# Patient Record
Sex: FEMALE | Race: WHITE | NOT HISPANIC OR LATINO | Employment: FULL TIME | URBAN - METROPOLITAN AREA
[De-identification: names, ages, dates, MRNs, and addresses within clinical notes are randomized per-mention and may not be internally consistent; named-entity substitution may affect disease eponyms.]

---

## 2022-05-31 ENCOUNTER — TELEPHONE (OUTPATIENT)
Dept: PODIATRY | Facility: CLINIC | Age: 37
End: 2022-05-31

## 2022-09-06 DIAGNOSIS — T75.3XXD MOTION SICKNESS, SUBSEQUENT ENCOUNTER: Primary | ICD-10-CM

## 2022-09-06 RX ORDER — SCOLOPAMINE TRANSDERMAL SYSTEM 1 MG/1
1 PATCH, EXTENDED RELEASE TRANSDERMAL
Qty: 4 PATCH | Refills: 0 | Status: SHIPPED | OUTPATIENT
Start: 2022-09-06 | End: 2023-02-22 | Stop reason: SDUPTHER

## 2023-05-06 ENCOUNTER — APPOINTMENT (OUTPATIENT)
Dept: LAB | Facility: CLINIC | Age: 38
End: 2023-05-06

## 2023-05-06 DIAGNOSIS — Z00.8 HEALTH EXAMINATION IN POPULATION SURVEY: ICD-10-CM

## 2023-05-06 LAB
CHOLEST SERPL-MCNC: 159 MG/DL
HDLC SERPL-MCNC: 76 MG/DL
LDLC SERPL CALC-MCNC: 74 MG/DL (ref 0–100)
NONHDLC SERPL-MCNC: 83 MG/DL
TRIGL SERPL-MCNC: 43 MG/DL

## 2023-05-07 LAB
EST. AVERAGE GLUCOSE BLD GHB EST-MCNC: 88 MG/DL
HBA1C MFR BLD: 4.7 %

## 2023-05-10 ENCOUNTER — APPOINTMENT (OUTPATIENT)
Dept: LAB | Facility: CLINIC | Age: 38
End: 2023-05-10

## 2023-05-10 ENCOUNTER — OFFICE VISIT (OUTPATIENT)
Dept: FAMILY MEDICINE CLINIC | Facility: CLINIC | Age: 38
End: 2023-05-10

## 2023-05-10 VITALS
BODY MASS INDEX: 27.05 KG/M2 | DIASTOLIC BLOOD PRESSURE: 82 MMHG | SYSTOLIC BLOOD PRESSURE: 120 MMHG | OXYGEN SATURATION: 98 % | RESPIRATION RATE: 16 BRPM | HEART RATE: 70 BPM | HEIGHT: 62 IN | WEIGHT: 147 LBS

## 2023-05-10 DIAGNOSIS — R53.83 FATIGUE, UNSPECIFIED TYPE: ICD-10-CM

## 2023-05-10 DIAGNOSIS — E01.0 THYROMEGALY: Primary | ICD-10-CM

## 2023-05-10 DIAGNOSIS — E55.9 VITAMIN D DEFICIENCY: ICD-10-CM

## 2023-05-10 DIAGNOSIS — R63.5 WEIGHT GAIN: ICD-10-CM

## 2023-05-10 DIAGNOSIS — E01.0 THYROMEGALY: ICD-10-CM

## 2023-05-10 LAB
25(OH)D3 SERPL-MCNC: 16.5 NG/ML (ref 30–100)
ALBUMIN SERPL BCP-MCNC: 4.2 G/DL (ref 3.5–5)
ALP SERPL-CCNC: 38 U/L (ref 34–104)
ALT SERPL W P-5'-P-CCNC: 16 U/L (ref 7–52)
ANION GAP SERPL CALCULATED.3IONS-SCNC: 5 MMOL/L (ref 4–13)
AST SERPL W P-5'-P-CCNC: 19 U/L (ref 13–39)
BASOPHILS # BLD AUTO: 0.04 THOUSANDS/ÂΜL (ref 0–0.1)
BASOPHILS NFR BLD AUTO: 1 % (ref 0–1)
BILIRUB SERPL-MCNC: 0.48 MG/DL (ref 0.2–1)
BUN SERPL-MCNC: 19 MG/DL (ref 5–25)
CALCIUM SERPL-MCNC: 9 MG/DL (ref 8.4–10.2)
CHLORIDE SERPL-SCNC: 105 MMOL/L (ref 96–108)
CO2 SERPL-SCNC: 26 MMOL/L (ref 21–32)
CREAT SERPL-MCNC: 0.79 MG/DL (ref 0.6–1.3)
EOSINOPHIL # BLD AUTO: 0.08 THOUSAND/ÂΜL (ref 0–0.61)
EOSINOPHIL NFR BLD AUTO: 2 % (ref 0–6)
ERYTHROCYTE [DISTWIDTH] IN BLOOD BY AUTOMATED COUNT: 12.6 % (ref 11.6–15.1)
GFR SERPL CREATININE-BSD FRML MDRD: 97 ML/MIN/1.73SQ M
GLUCOSE P FAST SERPL-MCNC: 86 MG/DL (ref 65–99)
HCT VFR BLD AUTO: 40.7 % (ref 34.8–46.1)
HGB BLD-MCNC: 13.1 G/DL (ref 11.5–15.4)
IMM GRANULOCYTES # BLD AUTO: 0.02 THOUSAND/UL (ref 0–0.2)
IMM GRANULOCYTES NFR BLD AUTO: 0 % (ref 0–2)
LYMPHOCYTES # BLD AUTO: 1.78 THOUSANDS/ÂΜL (ref 0.6–4.47)
LYMPHOCYTES NFR BLD AUTO: 32 % (ref 14–44)
MCH RBC QN AUTO: 30.7 PG (ref 26.8–34.3)
MCHC RBC AUTO-ENTMCNC: 32.2 G/DL (ref 31.4–37.4)
MCV RBC AUTO: 95 FL (ref 82–98)
MONOCYTES # BLD AUTO: 0.49 THOUSAND/ÂΜL (ref 0.17–1.22)
MONOCYTES NFR BLD AUTO: 9 % (ref 4–12)
NEUTROPHILS # BLD AUTO: 3.1 THOUSANDS/ÂΜL (ref 1.85–7.62)
NEUTS SEG NFR BLD AUTO: 56 % (ref 43–75)
NRBC BLD AUTO-RTO: 0 /100 WBCS
PLATELET # BLD AUTO: 239 THOUSANDS/UL (ref 149–390)
PMV BLD AUTO: 10 FL (ref 8.9–12.7)
POTASSIUM SERPL-SCNC: 4.4 MMOL/L (ref 3.5–5.3)
PROT SERPL-MCNC: 6.6 G/DL (ref 6.4–8.4)
RBC # BLD AUTO: 4.27 MILLION/UL (ref 3.81–5.12)
SODIUM SERPL-SCNC: 136 MMOL/L (ref 135–147)
TSH SERPL DL<=0.05 MIU/L-ACNC: 1.03 UIU/ML (ref 0.45–4.5)
WBC # BLD AUTO: 5.51 THOUSAND/UL (ref 4.31–10.16)

## 2023-05-10 NOTE — PROGRESS NOTES
Assessment/Plan:    1  Thyromegaly - left gland, US ordered    2  Fatigue - will start with labs    3  Weight gain - will start with labs    F/u pending results  F/u as needed        Subjective:   Chief Complaint   Patient presents with   • Thyroid Problem     Wants thyroid and hormone levels checked      Patient ID: Mckinley Cowden is a 30 y o  female  Patient feeling off, feeling bloated, gaining 10-12 lbs  Hair thinning thinning nails  Felt like a sudden shift  Working out 10 x a week, eating healthy  Sleeping well  Weened self off Wellbutrin and feels well mentally  Periods regular  The following portions of the patient's history were reviewed and updated as appropriate: allergies, current medications, past family history, past medical history, past social history, past surgical history and problem list     Past Medical History:   Diagnosis Date   • Bunion     Have had since childhood   • COVID 2022   • Depression    • History of seasonal allergies    • Hypotension    • Normal delivery      daughter   • Varicella     childhood   • Wears glasses      Past Surgical History:   Procedure Laterality Date   • AUGMENTATION BREAST Bilateral    • INDUCED      • FL BLEPHAROPLASTY LOWER EYELID Bilateral 10/4/2022    Procedure: BLEPHAROPLASTY LOWER;  Surgeon: Bryon Reveles MD;  Location: [address] [place] [address];  Service: Plastics   • FL  DELIVERY ONLY N/A 2018    Procedure:  SECTION ();   Surgeon: Micaela Jalloh MD;  Location: [facility];  Service: Obstetrics   • Piroska U  97  W/SESMDC W/DIST Kira Stall Bilateral 2022    Procedure: Ludin Turner;  Surgeon: Deanna Jaquez DPM;  Location: [address] [place] [address];  Service: Podiatry   • FL LIG/TRNSXJ FALOPIAN TUBE  DEL/ABDML SURG Bilateral 2018    Procedure: LIGATION/COAGULATION TUBAL;  Surgeon: Micaela Jalloh MD;  Location: [facility];  Service: Obstetrics   • WISDOM TOOTH EXTRACTION       Family History Problem Relation Age of Onset   • No Known Problems Mother    • No Known Problems Father    • No Known Problems Brother    • No Known Problems Daughter    • No Known Problems Maternal Grandmother    • No Known Problems Maternal Grandfather    • No Known Problems Paternal Grandmother    • Cancer Paternal Grandfather         lung   • Cancer Maternal Aunt         melanoma   • Alcohol abuse Neg Hx    • Substance Abuse Neg Hx    • Mental illness Neg Hx      Social History     Socioeconomic History   • Marital status: Legally      Spouse name: Tanesha Mcduffie   • Number of children: 1   • Years of education: Not on file   • Highest education level: Not on file   Occupational History   • 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  Employer: [employer]   Tobacco Use   • Smoking status: Former     Packs/day: 1 00     Years: 5 00     Pack years: 5 00     Types: Cigarettes     Quit date:      Years since quittin 3   • Smokeless tobacco: Never   Vaping Use   • Vaping Use: Never used   Substance and Sexual Activity   • Alcohol use: Yes     Alcohol/week: 2 0 standard drinks     Types: 2 Glasses of wine per week     Comment: Socially; 1-2X WEEK   • Drug use: Never   • Sexual activity: Yes     Partners: Male     Birth control/protection: Female Sterilization   Other Topics Concern   • Not on file   Social History Narrative   • Not on file     Social Determinants of Health     Financial Resource Strain: Not on file   Food Insecurity: Not on file   Transportation Needs: Not on file   Physical Activity: Not on file   Stress: Not on file   Social Connections: Not on file   Intimate Partner Violence: Not on file   Housing Stability: Not on file       Current Outpatient Medications:   •  hydroquinone 4 % cream, Apply twice daily all over face  Apply larger amount over brown spots   Continue for 3 months, then stop , Disp: 28 35 g, Rfl: 2  •  scopolamine (TRANSDERM-SCOP) 1 mg/3 days TD 72 hr patch, Place 1 patch on the skin over 72 hours "every third day, Disp: 4 patch, Rfl: 0    Review of Systems          Objective:    Vitals:    05/10/23 1034   BP: 120/82   Pulse: 70   Resp: 16   SpO2: 98%   Weight: 66 7 kg (147 lb)   Height: 5' 2\" (1 575 m)        Physical Exam  Constitutional:       Appearance: Normal appearance  She is well-developed and normal weight  HENT:      Head: Normocephalic and atraumatic  Neck:      Thyroid: Thyromegaly present  No thyroid mass or thyroid tenderness  Cardiovascular:      Rate and Rhythm: Normal rate and regular rhythm  Pulses: Normal pulses  Heart sounds: Normal heart sounds  No murmur heard  Pulmonary:      Effort: Pulmonary effort is normal  No respiratory distress  Breath sounds: Normal breath sounds  No wheezing or rales  Abdominal:      General: Abdomen is flat  Bowel sounds are normal  There is no distension  Palpations: Abdomen is soft  There is no mass  Tenderness: There is no abdominal tenderness  Musculoskeletal:         General: Normal range of motion  Cervical back: Normal range of motion and neck supple  Lymphadenopathy:      Cervical: No cervical adenopathy  Skin:     General: Skin is warm and dry  Neurological:      General: No focal deficit present  Mental Status: She is alert and oriented to person, place, and time  Cranial Nerves: No cranial nerve deficit  Deep Tendon Reflexes: Reflexes are normal and symmetric  Psychiatric:         Mood and Affect: Mood normal          Behavior: Behavior normal          Thought Content:  Thought content normal          Judgment: Judgment normal                "

## 2023-05-15 DIAGNOSIS — R63.5 WEIGHT GAIN: ICD-10-CM

## 2023-05-15 DIAGNOSIS — R53.83 FATIGUE, UNSPECIFIED TYPE: Primary | ICD-10-CM

## 2023-05-15 DIAGNOSIS — E01.0 THYROMEGALY: ICD-10-CM

## 2023-05-17 ENCOUNTER — APPOINTMENT (OUTPATIENT)
Dept: LAB | Facility: CLINIC | Age: 38
End: 2023-05-17

## 2023-05-17 DIAGNOSIS — E01.0 THYROMEGALY: ICD-10-CM

## 2023-05-17 DIAGNOSIS — R53.83 FATIGUE, UNSPECIFIED TYPE: ICD-10-CM

## 2023-05-17 DIAGNOSIS — R63.5 WEIGHT GAIN: ICD-10-CM

## 2023-05-17 LAB
CORTIS AM PEAK SERPL-MCNC: 14.1 UG/DL (ref 6.7–22.6)
INSULIN SERPL-ACNC: 4.11 UIU/ML (ref 1.9–23)
T3 SERPL-MCNC: 0.9 NG/ML
T4 FREE SERPL-MCNC: 0.97 NG/DL (ref 0.61–1.12)

## 2023-05-18 ENCOUNTER — HOSPITAL ENCOUNTER (OUTPATIENT)
Dept: ULTRASOUND IMAGING | Facility: HOSPITAL | Age: 38
Discharge: HOME | End: 2023-05-18

## 2023-05-18 DIAGNOSIS — E01.0 THYROMEGALY: ICD-10-CM

## 2023-05-23 ENCOUNTER — TELEPHONE (OUTPATIENT)
Dept: FAMILY MEDICINE CLINIC | Facility: CLINIC | Age: 38
End: 2023-05-23

## 2023-05-23 NOTE — TELEPHONE ENCOUNTER
Call from Fouzia at [facility], she said the result is in and there a re significant findings    See chart

## 2023-05-24 ENCOUNTER — TELEPHONE (OUTPATIENT)
Dept: PODIATRY | Facility: CLINIC | Age: 38
End: 2023-05-24

## 2023-05-30 ENCOUNTER — OFFICE VISIT (OUTPATIENT)
Dept: OBGYN CLINIC | Facility: CLINIC | Age: 38
End: 2023-05-30

## 2023-05-30 VITALS — DIASTOLIC BLOOD PRESSURE: 70 MMHG | WEIGHT: 145.8 LBS | SYSTOLIC BLOOD PRESSURE: 118 MMHG | BODY MASS INDEX: 26.67 KG/M2

## 2023-05-30 DIAGNOSIS — R14.0 ABDOMINAL BLOATING: Primary | ICD-10-CM

## 2023-05-30 DIAGNOSIS — R63.5 WEIGHT GAIN: ICD-10-CM

## 2023-05-30 NOTE — PROGRESS NOTES
Assessment/Plan:  Recent weight gain -reassured   nl TSH, Cortisol and insulin levels  No obvious explanation but has recently undergone foot surgery associated with a pause in exercise, recent discontinuation of Wellbutrin, increased stress and anxiety over NP education  During this time she has experienced abdominal bloating -recommend ultrasound of the pelvis  Vitals - 1 value per visit 2020   Weight (lb) 139 6 136 1 135 8 136 8     Vitals - 1 value per visit 10/4/2022 11/3/2022 11/15/2022 2022   Weight (lb) 136 142 140 3 140     Vitals - 1 value per visit 2022 2022 12/15/2022 2022   Weight (lb) 140 140  141     Vitals - 1 value per visit 2023 5/10/2023 2023   Weight (lb) 141 147 145 8       Diagnoses and all orders for this visit:    Abdominal bloating  -     US pelvis complete non OB; Future    Weight gain  -     US pelvis complete non OB; Future              Subjective:        Patient ID: John Trevino is a 30 y o  female  Tammy Rich is a  white female presenting to establish care  She was referred by her PCP due to complaints of weight gain and bloating which she noticed in 2022  She usually weighs 133-135lbs at home but weighed 140lbs back in January  Of note, she had bunion surgery in December and stopped exercising at that time  She also weaned herself off Wellbutrin back in February  She reestablished a routine recently  Her PCP tested for thyroid disease as well as cortisol levels and fasting insulin which were all normal and suggested she come to GYN for further evaluation  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 and admits to increased amount of stress in her life  There is no family history of obesity  Her last menstrual period was May 22  They seem to be lighter than usual   They are occurring every 26 to 28 days  She is in a monogamous relationship for the past 3 years    A tubal ligation was done at the time of her  section in 2018  The following portions of the patient's history were reviewed and updated as appropriate: She  has a past medical history of Bunion, COVID (2022), Depression, History of seasonal allergies, Hypotension, Normal delivery, Varicella, and Wears glasses  Patient Active Problem List    Diagnosis Date Noted   • Dermatochalasis 2022   • Situational depression 2020   • ADHD, predominantly inattentive type 2012    PMH:  Mark Nguyen  G3, P2; VIP ~18, SAVD F 7-7 , C/S with Tubal Ligation for Breech  M -  Neg Hx of STD's  ADHD  Situational Depression , weaned off of Wellbutrin   Covid   R Bunionectomy   She  has a past surgical history that includes [place] tooth extraction; Induced ; pr  delivery only (N/A, 2018); pr lig/trnsxj falopian tube  del/abdml surg (Bilateral, 2018); AUGMENTATION BREAST (Bilateral); pr blepharoplasty lower eyelid (Bilateral, 10/4/2022); and pr corrj hallux valgus w/sesmdc w/dist metar osteot (Bilateral, 2022)  Her family history includes Cancer in her maternal aunt and paternal grandfather; No Known Problems in her brother, daughter, father, maternal grandfather, maternal grandmother, mother, and paternal grandmother  FH:  MA - Melanoma d 46  PGF - Smoker, d Lung Ca 70  No FH of Obesity  She  reports that she quit smoking about 13 years ago  Her smoking use included cigarettes  She has a 5 00 pack-year smoking history  She has never used smokeless tobacco  She reports current alcohol use of about 2 0 standard drinks of alcohol per week  She reports that she does not use drugs  SH:  Charge RN [facility]  Studying for Psychiatric NP  Partner, Lilly Dos Santos, since   Current Outpatient Medications   Medication Sig Dispense Refill   • hydroquinone 4 % cream Apply twice daily all over face  Apply larger amount over brown spots   Continue for 3 months, then stop  28 35 g 2   • scopolamine (TRANSDERM-SCOP) 1 mg/3 days TD 72 hr patch Place 1 patch on the skin over 72 hours every third day 4 patch 0     No current facility-administered medications for this visit  Current Outpatient Medications on File Prior to Visit   Medication Sig   • hydroquinone 4 % cream Apply twice daily all over face  Apply larger amount over brown spots  Continue for 3 months, then stop  • scopolamine (TRANSDERM-SCOP) 1 mg/3 days TD 72 hr patch Place 1 patch on the skin over 72 hours every third day     No current facility-administered medications on file prior to visit  She is allergic to penicillins       Review of Systems   Constitutional: Positive for activity change and unexpected weight change  Negative for appetite change and fatigue  Respiratory: Negative for cough, chest tightness and shortness of breath  Cardiovascular: Negative for chest pain, palpitations and leg swelling  Gastrointestinal: Positive for abdominal distention  Negative for abdominal pain, constipation, diarrhea and vomiting  Genitourinary: Negative for difficulty urinating, dyspareunia, dysuria, flank pain, frequency, menstrual problem, pelvic pain, urgency, vaginal discharge and vaginal pain  Sexually active 3 times a week without problems   Musculoskeletal: Negative for arthralgias, back pain, gait problem and neck stiffness  Skin: Negative for color change  Neurological: Negative for dizziness, weakness, light-headedness and headaches  Psychiatric/Behavioral: Negative for dysphoric mood  The patient is nervous/anxious (Regarding school mentions feeling chronicly stressed)  Objective:    Vitals:    05/30/23 1507   BP: 118/70   BP Location: Right arm   Patient Position: Sitting   Cuff Size: Standard   Weight: 66 1 kg (145 lb 12 8 oz)            Physical Exam  Vitals and nursing note reviewed  Exam conducted with a chaperone present     Constitutional:       General: She is not in acute distress  Appearance: Normal appearance  She is normal weight  She is not ill-appearing  HENT:      Head: Normocephalic and atraumatic  Eyes:      General: No scleral icterus  Right eye: No discharge  Left eye: No discharge  Extraocular Movements: Extraocular movements intact  Cardiovascular:      Rate and Rhythm: Normal rate and regular rhythm  Heart sounds: Normal heart sounds  No murmur heard  Pulmonary:      Effort: Pulmonary effort is normal  No respiratory distress  Breath sounds: Normal breath sounds  Abdominal:      General: Abdomen is flat  There is no distension  Palpations: Abdomen is soft  Tenderness: There is no abdominal tenderness  There is no right CVA tenderness, left CVA tenderness, guarding or rebound  Genitourinary:     General: Normal vulva  Comments: The vagina and cervix are unremarkable  The cervix is nulliparous in appearance with clear mucus  Uterus is normal size and mobile  There are no adnexal masses appreciated  There is no pelvic tenderness  Musculoskeletal:         General: No swelling  Right lower leg: No edema  Left lower leg: No edema  Skin:     General: Skin is warm and dry  Neurological:      Mental Status: She is alert and oriented to person, place, and time  Psychiatric:         Mood and Affect: Mood normal          Behavior: Behavior normal          Thought Content:  Thought content normal          Judgment: Judgment normal

## 2023-05-30 NOTE — LETTER
May 30, 2023     Tyler [address], [address] [id number]    Patient: Roseanna Gilbert   YOB: 1985   Date of Visit: 2023       Dear Dr Oralia Feliz: Thank you for referring Lionel Dallas to me for evaluation  Below are my notes for this consultation  I was unable to come up with a satisfactory explanation for her weight gain although she has several contributing factors-recent surgery with the discontinuation of exercise, increased workload and stress with school, and the recent discontinuation of Wellbutrin which can help people lose weight  And lastly advancing age  Because of the bloating I have recommended an ultrasound of the pelvis although I think the yield for this will be low regarding any ovarian pathology  If you have questions, please do not hesitate to call me  I look forward to following your patient along with you  Sincerely,        Suraj Merchant MD        CC: No Recipients    Suraj Merchant MD  2023  4:13 PM  Incomplete  Assessment/Plan:  Recent weight gain -reassured  nl TSH, Cortisol and insulin levels  No obvious explanation but has recently undergone foot surgery associated with a pause in exercise, recent discontinuation of Wellbutrin, increased stress and anxiety over NP education  During this time she has experienced abdominal bloating -recommend ultrasound of the pelvis    Diagnoses and all orders for this visit:    Abdominal bloating  -     US pelvis complete non OB; Future    Weight gain  -     US pelvis complete non OB; Future             Subjective:       Patient ID: Roseanna Gilbert is a 30 y o  female  Gerri Gustafson is a  white female presenting to [facility] care  She was referred by her PCP due to complaints of weight gain and bloating which she noticed in 2022  She usually weighs 133-135lbs at home but weighed 140lbs back in January   Of note, she had bunion surgery in December and stopped exercising at that time  She also weaned herself off Wellbutrin back in February  She reestablished a routine recently  Her PCP tested for thyroid disease as well as cortisol levels and fasting insulin which were all normal and suggested she come to GYN for further evaluation  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 and admits to increased amount of stress in her life  There is no family history of obesity  Her last menstrual period was May 22  They seem to be lighter than usual   They are occurring every 26 to 28 days  She is in a monogamous relationship for the past 3 years  A tubal ligation was done at the time of her  section in 2018  The following portions of the patient's history were reviewed and updated as appropriate: She  has a past medical history of Bunion, COVID (2022), Depression, History of seasonal allergies, Hypotension, Normal delivery, Varicella, and Wears glasses  Patient Active Problem List    Diagnosis Date Noted   • Dermatochalasis 2022   • Situational depression 2020   • ADHD, predominantly inattentive type 2012    PMH:  Amado Arsh  G3, P2; VIP ~18, SAVD F 7-7 , C/S with Tubal Ligation for Breech  M -  Neg Hx of STD's  ADHD  Situational Depression , weaned off of Wellbutrin   Covid   R Bunionectomy   She  has a past surgical history that includes [place] tooth extraction; Induced ; pr  delivery only (N/A, 2018); pr lig/trnsxj falopian tube  del/abdml surg (Bilateral, 2018); AUGMENTATION BREAST (Bilateral); pr blepharoplasty lower eyelid (Bilateral, 10/4/2022); and pr corrj hallux valgus w/sesmdc w/dist metar osteot (Bilateral, 2022)  Her family history includes Cancer in her maternal aunt and paternal grandfather; No Known Problems in her brother, daughter, father, maternal grandfather, maternal grandmother, mother, and paternal grandmother     FH:  MA - Melanoma d 52  PGF - Smoker, d Lung Ca 72  No FH of Obesity  She  reports that she quit smoking about 13 years ago  Her smoking use included cigarettes  She has a 5 00 pack-year smoking history  She has never used smokeless tobacco  She reports current alcohol use of about 2 0 standard drinks of alcohol per week  She reports that she does not use drugs  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  Partner, Jordin Arroyo, since '20  Current Outpatient Medications   Medication Sig Dispense Refill   • hydroquinone 4 % cream Apply twice daily all over face  Apply larger amount over brown spots  Continue for 3 months, then stop  28 35 g 2   • scopolamine (TRANSDERM-SCOP) 1 mg/3 days TD 72 hr patch Place 1 patch on the skin over 72 hours every third day 4 patch 0     No current facility-administered medications for this visit  Current Outpatient Medications on File Prior to Visit   Medication Sig   • hydroquinone 4 % cream Apply twice daily all over face  Apply larger amount over brown spots  Continue for 3 months, then stop  • scopolamine (TRANSDERM-SCOP) 1 mg/3 days TD 72 hr patch Place 1 patch on the skin over 72 hours every third day     No current facility-administered medications on file prior to visit  She is allergic to penicillins       Review of Systems   Constitutional: Positive for activity change and unexpected weight change  Negative for appetite change and fatigue  Respiratory: Negative for cough, chest tightness and shortness of breath  Cardiovascular: Negative for chest pain, palpitations and leg swelling  Gastrointestinal: Positive for abdominal distention  Negative for abdominal pain, constipation, diarrhea and vomiting  Genitourinary: Negative for difficulty urinating, dyspareunia, dysuria, flank pain, frequency, menstrual problem, pelvic pain, urgency, vaginal discharge and vaginal pain          Sexually active 3 times a week without problems   Musculoskeletal: Negative for arthralgias, back pain, gait problem and neck stiffness  Skin: Negative for color change  Neurological: Negative for dizziness, weakness, light-headedness and headaches  Psychiatric/Behavioral: Negative for dysphoric mood  The patient is nervous/anxious (Regarding school mentions feeling chronicly stressed)  Objective:    Vitals:    05/30/23 1507   BP: 118/70   BP Location: Right arm   Patient Position: Sitting   Cuff Size: Standard   Weight: 66 1 kg (145 lb 12 8 oz)           Physical Exam  Vitals and nursing note reviewed  Exam conducted with a chaperone present  Constitutional:       General: She is not in acute distress  Appearance: Normal appearance  She is normal weight  She is not ill-appearing  HENT:      Head: Normocephalic and atraumatic  Eyes:      General: No scleral icterus  Right eye: No discharge  Left eye: No discharge  Extraocular Movements: Extraocular movements intact  Cardiovascular:      Rate and Rhythm: Normal rate and regular rhythm  Heart sounds: Normal heart sounds  No murmur heard  Pulmonary:      Effort: Pulmonary effort is normal  No respiratory distress  Breath sounds: Normal breath sounds  Abdominal:      General: Abdomen is flat  There is no distension  Palpations: Abdomen is soft  Tenderness: There is no abdominal tenderness  There is no right CVA tenderness, left CVA tenderness, guarding or rebound  Genitourinary:     General: Normal vulva  Comments: The vagina and cervix are unremarkable  The cervix is nulliparous in appearance with clear mucus  Uterus is normal size and mobile  There are no adnexal masses appreciated  There is no pelvic tenderness  Musculoskeletal:         General: No swelling  Right lower leg: No edema  Left lower leg: No edema  Skin:     General: Skin is warm and dry  Neurological:      Mental Status: She is alert and oriented to person, place, and time     Psychiatric: Mood and Affect: Mood normal          Behavior: Behavior normal          Thought Content: Thought content normal          Judgment: Judgment normal           Martin Richey MD  2023  3:44 PM  Sign when Signing Visit  Assessment/Plan:    nl TSH, Cortisol and insulin levels    Diagnoses and all orders for this visit:    Abdominal bloating  -     US pelvis complete non OB; Future    Weight gain  -     US pelvis complete non OB; Future             Subjective:       Patient ID: John Trevino is a 30 y o  female  Tammy Rich is a  white female presenting to establish care  She was referred by her PCP due to complaints of weight gain and bloating which she noticed in 2022  She usually weighs 133-135lbs at home but weighed 140lbs back in January  Of note, she had bunion surgery in December and stopped exercising at the time  She also weaned herself off Wellbutrin back in February  Her PCP tested for thyroid disease as well as cortisol levels and fasting insulin which were all normal and suggested she come to GYN for further evaluation  The following portions of the patient's history were reviewed and updated as appropriate: She  has a past medical history of Bunion COVID (2022), Depression, History of seasonal allergies, Hypotension, Normal delivery, Varicella, and Wears glasses  Patient Active Problem List    Diagnosis Date Noted   • Dermatochalasis 2022   • Situational depression 2020   • ADHD, predominantly inattentive type 2012     She  has a past surgical history that includes [place] tooth extraction; Induced ; pr  delivery only (N/A, 2018); pr lig/trnsxj falopian tube  del/abdml surg (Bilateral, 2018); AUGMENTATION BREAST (Bilateral); pr blepharoplasty lower eyelid (Bilateral, 10/4/2022); and pr corrj hallux valgus w/sesmdc w/dist metar osteot (Bilateral, 2022)    Her family history includes Cancer in her maternal aunt and paternal grandfather; No Known Problems in her brother, daughter, father, maternal grandfather, maternal grandmother, mother, and paternal grandmother  She  reports that she quit smoking about 13 years ago  Her smoking use included cigarettes  She has a 5 00 pack-year smoking history  She has never used smokeless tobacco  She reports current alcohol use of about 2 0 standard drinks of alcohol per week  She reports that she does not use drugs  Current Outpatient Medications   Medication Sig Dispense Refill   • hydroquinone 4 % cream Apply twice daily all over face  Apply larger amount over brown spots  Continue for 3 months, then stop  28 35 g 2   • scopolamine (TRANSDERM-SCOP) 1 mg/3 days TD 72 hr patch Place 1 patch on the skin over 72 hours every third day 4 patch 0     No current facility-administered medications for this visit  Current Outpatient Medications on File Prior to Visit   Medication Sig   • hydroquinone 4 % cream Apply twice daily all over face  Apply larger amount over brown spots  Continue for 3 months, then stop  • scopolamine (TRANSDERM-SCOP) 1 mg/3 days TD 72 hr patch Place 1 patch on the skin over 72 hours every third day     No current facility-administered medications on file prior to visit  She is allergic to penicillins       Review of Systems   Respiratory: Negative for shortness of breath  Cardiovascular: Negative for chest pain  Gastrointestinal: Positive for abdominal distention  Negative for abdominal pain, diarrhea and vomiting  Genitourinary: Negative for dyspareunia, dysuria and menstrual problem  Sexually active 3 times a week without problems   Psychiatric/Behavioral: Negative for dysphoric mood  Objective:    Vitals:    05/30/23 1507   BP: 118/70   BP Location: Right arm   Patient Position: Sitting   Cuff Size: Standard   Weight: 66 1 kg (145 lb 12 8 oz)           Physical Exam  Vitals and nursing note reviewed   Exam conducted with a chaperone present  Constitutional:       General: She is not in acute distress  Appearance: Normal appearance  She is normal weight  She is not ill-appearing  HENT:      Head: Normocephalic and atraumatic  Eyes:      General: No scleral icterus  Right eye: No discharge  Left eye: No discharge  Extraocular Movements: Extraocular movements intact  Cardiovascular:      Rate and Rhythm: Normal rate and regular rhythm  Heart sounds: Normal heart sounds  No murmur heard  Pulmonary:      Effort: Pulmonary effort is normal  No respiratory distress  Breath sounds: Normal breath sounds  Abdominal:      General: Abdomen is flat  There is no distension  Palpations: Abdomen is soft  Tenderness: There is no abdominal tenderness  There is no right CVA tenderness, left CVA tenderness, guarding or rebound  Genitourinary:     General: Normal vulva  Comments: The vagina and cervix are unremarkable  The cervix is nulliparous in appearance with clear mucus  Uterus is normal size and mobile  There are no adnexal masses appreciated  There is no pelvic tenderness  Musculoskeletal:         General: No swelling  Right lower leg: No edema  Left lower leg: No edema  Skin:     General: Skin is warm and dry  Neurological:      Mental Status: She is alert and oriented to person, place, and time  Psychiatric:         Mood and Affect: Mood normal          Behavior: Behavior normal          Thought Content:  Thought content normal          Judgment: Judgment normal

## 2023-06-22 ENCOUNTER — APPOINTMENT (EMERGENCY)
Dept: CT IMAGING | Facility: HOSPITAL | Age: 38
End: 2023-06-22
Payer: COMMERCIAL

## 2023-06-22 ENCOUNTER — HOSPITAL ENCOUNTER (EMERGENCY)
Facility: HOSPITAL | Age: 38
Discharge: HOME | End: 2023-06-23
Attending: EMERGENCY MEDICINE
Payer: COMMERCIAL

## 2023-06-22 DIAGNOSIS — K29.00 ACUTE GASTRITIS: Primary | ICD-10-CM

## 2023-06-22 DIAGNOSIS — M79.89 LEFT ARM SWELLING: ICD-10-CM

## 2023-06-22 LAB
ALBUMIN SERPL BCP-MCNC: 4 G/DL (ref 3.5–5)
ALP SERPL-CCNC: 49 U/L (ref 34–104)
ALT SERPL W P-5'-P-CCNC: 13 U/L (ref 7–52)
ANION GAP SERPL CALCULATED.3IONS-SCNC: 7 MMOL/L
AST SERPL W P-5'-P-CCNC: 17 U/L (ref 13–39)
BASOPHILS # BLD AUTO: 0.04 THOUSANDS/ÂΜL (ref 0–0.1)
BASOPHILS NFR BLD AUTO: 1 % (ref 0–1)
BILIRUB SERPL-MCNC: 0.23 MG/DL (ref 0.2–1)
BILIRUB UR QL STRIP: NEGATIVE
BUN SERPL-MCNC: 14 MG/DL (ref 5–25)
CALCIUM SERPL-MCNC: 9.1 MG/DL (ref 8.4–10.2)
CHLORIDE SERPL-SCNC: 105 MMOL/L (ref 96–108)
CLARITY UR: CLEAR
CO2 SERPL-SCNC: 26 MMOL/L (ref 21–32)
COLOR UR: COLORLESS
CREAT SERPL-MCNC: 0.71 MG/DL (ref 0.6–1.3)
EOSINOPHIL # BLD AUTO: 0.16 THOUSAND/ÂΜL (ref 0–0.61)
EOSINOPHIL NFR BLD AUTO: 2 % (ref 0–6)
ERYTHROCYTE [DISTWIDTH] IN BLOOD BY AUTOMATED COUNT: 12.5 % (ref 11.6–15.1)
EXT PREGNANCY TEST URINE: NEGATIVE
EXT. CONTROL: NORMAL
GFR SERPL CREATININE-BSD FRML MDRD: 110 ML/MIN/1.73SQ M
GLUCOSE SERPL-MCNC: 84 MG/DL (ref 65–140)
GLUCOSE UR STRIP-MCNC: NEGATIVE MG/DL
HCT VFR BLD AUTO: 40.8 % (ref 34.8–46.1)
HGB BLD-MCNC: 13.5 G/DL (ref 11.5–15.4)
HGB UR QL STRIP.AUTO: NEGATIVE
IMM GRANULOCYTES # BLD AUTO: 0.04 THOUSAND/UL (ref 0–0.2)
IMM GRANULOCYTES NFR BLD AUTO: 1 % (ref 0–2)
KETONES UR STRIP-MCNC: NEGATIVE MG/DL
LEUKOCYTE ESTERASE UR QL STRIP: NEGATIVE
LIPASE SERPL-CCNC: 24 U/L (ref 11–82)
LYMPHOCYTES # BLD AUTO: 2.94 THOUSANDS/ÂΜL (ref 0.6–4.47)
LYMPHOCYTES NFR BLD AUTO: 39 % (ref 14–44)
MCH RBC QN AUTO: 31 PG (ref 26.8–34.3)
MCHC RBC AUTO-ENTMCNC: 33.1 G/DL (ref 31.4–37.4)
MCV RBC AUTO: 94 FL (ref 82–98)
MONOCYTES # BLD AUTO: 0.75 THOUSAND/ÂΜL (ref 0.17–1.22)
MONOCYTES NFR BLD AUTO: 10 % (ref 4–12)
NEUTROPHILS # BLD AUTO: 3.66 THOUSANDS/ÂΜL (ref 1.85–7.62)
NEUTS SEG NFR BLD AUTO: 47 % (ref 43–75)
NITRITE UR QL STRIP: NEGATIVE
NRBC BLD AUTO-RTO: 0 /100 WBCS
PH UR STRIP.AUTO: 5.5 [PH]
PLATELET # BLD AUTO: 275 THOUSANDS/UL (ref 149–390)
PMV BLD AUTO: 9.7 FL (ref 8.9–12.7)
POTASSIUM SERPL-SCNC: 3.6 MMOL/L (ref 3.5–5.3)
PROT SERPL-MCNC: 6.4 G/DL (ref 6.4–8.4)
PROT UR STRIP-MCNC: NEGATIVE MG/DL
RBC # BLD AUTO: 4.36 MILLION/UL (ref 3.81–5.12)
SODIUM SERPL-SCNC: 138 MMOL/L (ref 135–147)
SP GR UR STRIP.AUTO: 1 (ref 1–1.03)
UROBILINOGEN UR STRIP-ACNC: <2 MG/DL
WBC # BLD AUTO: 7.59 THOUSAND/UL (ref 4.31–10.16)

## 2023-06-22 PROCEDURE — 85025 COMPLETE CBC W/AUTO DIFF WBC: CPT | Performed by: EMERGENCY MEDICINE

## 2023-06-22 PROCEDURE — G1004 CDSM NDSC: HCPCS

## 2023-06-22 PROCEDURE — 80053 COMPREHEN METABOLIC PANEL: CPT | Performed by: EMERGENCY MEDICINE

## 2023-06-22 PROCEDURE — C9113 INJ PANTOPRAZOLE SODIUM, VIA: HCPCS | Performed by: EMERGENCY MEDICINE

## 2023-06-22 PROCEDURE — 74177 CT ABD & PELVIS W/CONTRAST: CPT

## 2023-06-22 PROCEDURE — 83690 ASSAY OF LIPASE: CPT | Performed by: EMERGENCY MEDICINE

## 2023-06-22 PROCEDURE — 36415 COLL VENOUS BLD VENIPUNCTURE: CPT | Performed by: EMERGENCY MEDICINE

## 2023-06-22 PROCEDURE — 81003 URINALYSIS AUTO W/O SCOPE: CPT | Performed by: EMERGENCY MEDICINE

## 2023-06-22 PROCEDURE — 81025 URINE PREGNANCY TEST: CPT | Performed by: EMERGENCY MEDICINE

## 2023-06-22 RX ORDER — SUCRALFATE 1 G/1
1 TABLET ORAL ONCE
Status: COMPLETED | OUTPATIENT
Start: 2023-06-22 | End: 2023-06-22

## 2023-06-22 RX ORDER — PANTOPRAZOLE SODIUM 40 MG/10ML
40 INJECTION, POWDER, LYOPHILIZED, FOR SOLUTION INTRAVENOUS ONCE
Status: COMPLETED | OUTPATIENT
Start: 2023-06-22 | End: 2023-06-22

## 2023-06-22 RX ORDER — KETOROLAC TROMETHAMINE 30 MG/ML
15 INJECTION, SOLUTION INTRAMUSCULAR; INTRAVENOUS ONCE
Status: COMPLETED | OUTPATIENT
Start: 2023-06-22 | End: 2023-06-22

## 2023-06-22 RX ADMIN — PANTOPRAZOLE SODIUM 40 MG: 40 INJECTION, POWDER, FOR SOLUTION INTRAVENOUS at 22:15

## 2023-06-22 RX ADMIN — KETOROLAC TROMETHAMINE 15 MG: 30 INJECTION, SOLUTION INTRAMUSCULAR at 22:15

## 2023-06-22 RX ADMIN — SUCRALFATE 1 G: 1 TABLET ORAL at 22:15

## 2023-06-22 RX ADMIN — IOHEXOL 100 ML: 350 INJECTION, SOLUTION INTRAVENOUS at 23:47

## 2023-06-23 VITALS
HEART RATE: 60 BPM | OXYGEN SATURATION: 100 % | RESPIRATION RATE: 16 BRPM | SYSTOLIC BLOOD PRESSURE: 118 MMHG | DIASTOLIC BLOOD PRESSURE: 64 MMHG | TEMPERATURE: 98.8 F

## 2023-06-23 RX ORDER — PANTOPRAZOLE SODIUM 40 MG/1
40 TABLET, DELAYED RELEASE ORAL DAILY
Qty: 30 TABLET | Refills: 0 | Status: SHIPPED | OUTPATIENT
Start: 2023-06-23

## 2023-06-23 RX ORDER — SUCRALFATE 1 G/1
1 TABLET ORAL 4 TIMES DAILY
Qty: 60 TABLET | Refills: 0 | Status: SHIPPED | OUTPATIENT
Start: 2023-06-23

## 2023-06-23 NOTE — ED PROVIDER NOTES
History  Chief Complaint   Patient presents with   • Abdominal Pain     Epigastric abdominal pain, started yesterday  Patient describes pain as squeezing, worse after eating, some nausea, no vomiting or diarrhea       History provided by:  Patient   used: No    Abdominal Pain  Associated symptoms: no chest pain, no constipation, no cough, no diarrhea, no dysuria, no fatigue, no fever, no nausea, no shortness of breath and no vomiting    30year-old previously healthy female presented for evaluation of epigastric pain for the last 2 days, like a squeezing sensation which has been constant but significantly worse intensity after eating any type of food  Denies any nausea, vomiting, diarrhea, fever, chills  No urinary complaints  LMP was about 5 days ago  History of  and BTL  No family history of biliary disease  On exam here appears uncomfortable  Vitals are normal   Abdomen is soft with tenderness in the epigastrium and also the lower quadrants  Differential diagnosis includes cholelithiasis/cholecystitis, pancreatitis, peptic/gastric ulcer, viral illness  Plan labs, UA, CT, pain control, reevaluate  Prior to Admission Medications   Prescriptions Last Dose Informant Patient Reported? Taking?   hydroquinone 4 % cream  Self No No   Sig: Apply twice daily all over face  Apply larger amount over brown spots   Continue for 3 months, then stop    scopolamine (TRANSDERM-SCOP) 1 mg/3 days TD 72 hr patch  Self No No   Sig: Place 1 patch on the skin over 72 hours every third day      Facility-Administered Medications: None       Past Medical History:   Diagnosis Date   • Bunion     Have had since childhood   • COVID 2022   • Depression    • History of seasonal allergies    • Hypotension    • Normal delivery     2014 daughter   • Varicella     childhood   • Wears glasses        Past Surgical History:   Procedure Laterality Date   • AUGMENTATION BREAST Bilateral    • INDUCED  • CA BLEPHAROPLASTY LOWER EYELID Bilateral 10/4/2022    Procedure: BLEPHAROPLASTY LOWER;  Surgeon: Em Lepe MD;  Location: [address] [place] [address] OR;  Service: Plastics   • CA  DELIVERY ONLY N/A 2018    Procedure:  SECTION (); Surgeon: Sandip Avalos MD;  Location: [facility];  Service: Obstetrics   • Piroska U  89  W/SESMDC W/DIST Vernal Haroldo Bilateral 2022    Procedure: Paloma Chalet;  Surgeon: Bolivar Meadows DPM;  Location: [address] [place] [address] OR;  Service: Podiatry   • CA LIG/TRNSXJ FALOPIAN TUBE  DEL/ABDML SURG Bilateral 2018    Procedure: LIGATION/COAGULATION TUBAL;  Surgeon: Sandip Avalos MD;  Location: [facility];  Service: Obstetrics   • WISDOM TOOTH EXTRACTION         Family History   Problem Relation Age of Onset   • No Known Problems Mother    • No Known Problems Father    • No Known Problems Brother    • No Known Problems Daughter    • No Known Problems Maternal Grandmother    • No Known Problems Maternal Grandfather    • No Known Problems Paternal Grandmother    • Cancer Paternal Grandfather         lung   • Cancer Maternal Aunt         melanoma   • Alcohol abuse Neg Hx    • Substance Abuse Neg Hx    • Mental illness Neg Hx      I have reviewed and agree with the history as documented  E-Cigarette/Vaping   • E-Cigarette Use Never User      E-Cigarette/Vaping Substances   • Nicotine No    • THC No    • CBD No    • Flavoring No    • Other No    • Unknown No      Social History     Tobacco Use   • Smoking status: Former     Packs/day: 1 00     Years: 5 00     Total pack years: 5 00     Types: Cigarettes     Quit date:      Years since quittin 4   • Smokeless tobacco: Never   Vaping Use   • Vaping Use: Never used   Substance Use Topics   • Alcohol use:  Yes     Alcohol/week: 2 0 standard drinks of alcohol     Types: 2 Glasses of wine per week     Comment: Socially; 1-2X WEEK   • Drug use: Never       Review of Systems   Constitutional: Negative for activity change, appetite change, fatigue and fever  Respiratory: Negative for cough, chest tightness and shortness of breath  Cardiovascular: Negative for chest pain  Gastrointestinal: Positive for abdominal pain  Negative for constipation, diarrhea, nausea and vomiting  Genitourinary: Negative for difficulty urinating, dysuria and flank pain  Musculoskeletal: Negative for back pain and neck pain  Skin: Negative for color change and rash  Neurological: Negative for dizziness, weakness, light-headedness and headaches  All other systems reviewed and are negative  Physical Exam  Physical Exam  Vitals and nursing note reviewed  Constitutional:       Appearance: She is well-developed  HENT:      Head: Normocephalic and atraumatic  Mouth/Throat:      Mouth: Mucous membranes are moist    Cardiovascular:      Rate and Rhythm: Normal rate and regular rhythm  Pulmonary:      Effort: Pulmonary effort is normal  No respiratory distress  Abdominal:      General: Abdomen is flat  There is no distension  Palpations: Abdomen is soft  Hernia: No hernia is present  Comments: Tenderness in the epigastrium as well as the lower quadrants without guarding  Skin:     General: Skin is warm and dry  Neurological:      General: No focal deficit present  Mental Status: She is alert and oriented to person, place, and time     Psychiatric:         Mood and Affect: Mood normal          Behavior: Behavior normal          Vital Signs  ED Triage Vitals [06/22/23 2134]   Temperature Pulse Respirations Blood Pressure SpO2   98 8 °F (37 1 °C) 56 16 154/87 100 %      Temp Source Heart Rate Source Patient Position - Orthostatic VS BP Location FiO2 (%)   Oral Monitor Sitting Right arm --      Pain Score       2           Vitals:    06/22/23 2134 06/23/23 0034   BP: 154/87 118/64   Pulse: 56 60   Patient Position - Orthostatic VS: Sitting Lying         Visual Acuity      ED Medications  Medications   sucralfate (CARAFATE) tablet 1 g (1 g Oral Given 6/22/23 2215)   pantoprazole (PROTONIX) injection 40 mg (40 mg Intravenous Given 6/22/23 2215)   ketorolac (TORADOL) injection 15 mg (15 mg Intravenous Given 6/22/23 2215)   iohexol (OMNIPAQUE) 350 MG/ML injection (SINGLE-DOSE) 100 mL (100 mL Intravenous Given 6/22/23 2347)       Diagnostic Studies  Results Reviewed     Procedure Component Value Units Date/Time    Comprehensive metabolic panel [[id number]] Collected: 06/22/23 2250    Lab Status: Final result Specimen: Blood from Line, Venous Updated: 06/22/23 2334     Sodium 138 mmol/L      Potassium 3 6 mmol/L      Chloride 105 mmol/L      CO2 26 mmol/L      ANION GAP 7 mmol/L      BUN 14 mg/dL      Creatinine 0 71 mg/dL      Glucose 84 mg/dL      Calcium 9 1 mg/dL      AST 17 U/L      ALT 13 U/L      Alkaline Phosphatase 49 U/L      Total Protein 6 4 g/dL      Albumin 4 0 g/dL      Total Bilirubin 0 23 mg/dL      eGFR 110 ml/min/1 73sq m     Narrative:      [place] guidelines for Chronic Kidney Disease (CKD):   •  Stage 1 with normal or high GFR (GFR > 90 mL/min/1 73 square meters)  •  Stage 2 Mild CKD (GFR = 60-89 mL/min/1 73 square meters)  •  Stage 3A Moderate CKD (GFR = 45-59 mL/min/1 73 square meters)  •  Stage 3B Moderate CKD (GFR = 30-44 mL/min/1 73 square meters)  •  Stage 4 Severe CKD (GFR = 15-29 mL/min/1 73 square meters)  •  Stage 5 End Stage CKD (GFR <15 mL/min/1 73 square meters)  Note: GFR calculation is accurate only with a steady state creatinine    Lipase [[id number]]  (Normal) Collected: 06/22/23 2250    Lab Status: Final result Specimen: Blood from Line, Venous Updated: 06/22/23 2334     Lipase 24 u/L     UA (URINE) with reflex to Scope [[id number]] Collected: 06/22/23 2215    Lab Status: Final result Specimen: Urine, Clean Catch Updated: 06/22/23 2249     Color, UA Colorless     Clarity, UA Clear     Specific [place], UA 1 005     pH, UA 5 5 Leukocytes, UA Negative     Nitrite, UA Negative     Protein, UA Negative mg/dl      Glucose, UA Negative mg/dl      Ketones, UA Negative mg/dl      Urobilinogen, UA <2 0 mg/dl      Bilirubin, UA Negative     Occult Blood, UA Negative    CBC and differential [[id number]] Collected: 06/22/23 2151    Lab Status: Final result Specimen: Blood from Arm, Right Updated: 06/22/23 2157     WBC 7 59 Thousand/uL      RBC 4 36 Million/uL      Hemoglobin 13 5 g/dL      Hematocrit 40 8 %      MCV 94 fL      MCH 31 0 pg      MCHC 33 1 g/dL      RDW 12 5 %      MPV 9 7 fL      Platelets 763 Thousands/uL      nRBC 0 /100 WBCs      Neutrophils Relative 47 %      Immat GRANS % 1 %      Lymphocytes Relative 39 %      Monocytes Relative 10 %      Eosinophils Relative 2 %      Basophils Relative 1 %      Neutrophils Absolute 3 66 Thousands/µL      Immature Grans Absolute 0 04 Thousand/uL      Lymphocytes Absolute 2 94 Thousands/µL      Monocytes Absolute 0 75 Thousand/µL      Eosinophils Absolute 0 16 Thousand/µL      Basophils Absolute 0 04 Thousands/µL     POCT pregnancy, urine [[id number]]  (Normal) Resulted: 06/22/23 2151    Lab Status: Final result Updated: 06/22/23 2151     EXT Preg Test, Ur Negative     Control Valid                 CT abdomen pelvis with contrast   Final Result by Marilyn Delacruz MD (06/23 0105)      1   5 mm nonobstructive calculus in the right kidney  No hydronephrosis  2   Diverticulosis without evidence of diverticulitis  Workstation performed: [id number]                    Procedures  Procedures         ED Course  ED Course as of 06/23/23 0116   u Jun 22, 2023   2344 Urinalysis, UA, CBC, CMP, lipase all within normal limits  Fri Jun 23, 2023   0107 Nonspecific CT findings  Suspect most likely gastritis or early gastric ulcer  We will continue Protonix, Carafate at home and refer to GI for further evaluation  Discussed dietary changes including avoiding alcohol and coffee on empty stomach  SBIRT 20yo+    Flowsheet Row Most Recent Value   Initial Alcohol Screen: US AUDIT-C     1  How often do you have a drink containing alcohol? 1 Filed at: 06/22/2023 2236   2  How many drinks containing alcohol do you have on a typical day you are drinking? 1 Filed at: 06/22/2023 2236   3a  Male UNDER 65: How often do you have five or more drinks on one occasion? 0 Filed at: 06/22/2023 2236   3b  FEMALE Any Age, or MALE 65+: How often do you have 4 or more drinks on one occassion? 0 Filed at: 06/22/2023 2236   Audit-C Score 2 Filed at: 06/22/2023 2236   SHOAIB: How many times in the past year have you    Used an illegal drug or used a prescription medication for non-medical reasons? Never Filed at: 06/22/2023 2236                    Medical Decision Making  79-year-old previously healthy female presented for evaluation of epigastric pain for the last 2 days, like a squeezing sensation which has been constant but significantly worse intensity after eating any type of food  Abdomen is soft with tenderness in the epigastrium and also the lower quadrants  UA and lab work unremarkable  CT notable for a nonobstructing renal stone, diverticulosis without diverticulitis  Patient reported symptomatic improvement here  Stable for discharge home  We will continue Carafate and Protonix and refer to GI for further evaluation of suspected gastritis, early ulcer  Discussed avoiding irritant foods  Amount and/or Complexity of Data Reviewed  Labs: ordered  Radiology: ordered  Risk  Prescription drug management            Disposition  Final diagnoses:   Acute gastritis     Time reflects when diagnosis was documented in both MDM as applicable and the Disposition within this note     Time User Action Codes Description Comment    6/23/2023  1:12 AM Brenda DILLON Add [K29 00] Acute gastritis       ED Disposition     ED Disposition   Discharge    Condition   Stable    Date/Time   Fri Jun 23, 2023  1:12 AM    Comment   Collin Shanika Cinthiaarsalan discharge to home/self care                 Follow-up Information     Follow up With Specialties Details Why Contact Info Additional [facility]   [address] [place] [address] [facility] [phone]  Flo Mccracken [address] [facility] [place], [facility] [address] [facility], [facility] [address], [place], [place], [address] [phone]          Patient's Medications   Discharge Prescriptions    PANTOPRAZOLE (PROTONIX) 40 MG TABLET    Take 1 tablet (40 mg total) by mouth daily       Start Date: 6/23/2023 End Date: --       Order Dose: 40 mg       Quantity: 30 tablet    Refills: 0    SUCRALFATE (CARAFATE) 1 G TABLET    Take 1 tablet (1 g total) by mouth 4 (four) times a day 30 min prior to meals and HS       Start Date: 6/23/2023 End Date: --       Order Dose: 1 g       Quantity: 60 tablet    Refills: 0           PDMP Review     None          ED Provider  Electronically Signed by           Jose Lara MD  06/23/23 7347

## 2023-06-28 ENCOUNTER — TELEPHONE (OUTPATIENT)
Dept: OBGYN CLINIC | Facility: CLINIC | Age: 38
End: 2023-06-28

## 2023-06-29 ENCOUNTER — HOSPITAL ENCOUNTER (OUTPATIENT)
Dept: VASCULAR ULTRASOUND | Facility: HOSPITAL | Age: 38
Discharge: HOME | End: 2023-06-29
Attending: EMERGENCY MEDICINE
Payer: COMMERCIAL

## 2023-06-29 DIAGNOSIS — M79.89 LEFT ARM SWELLING: ICD-10-CM

## 2023-06-29 PROCEDURE — 93971 EXTREMITY STUDY: CPT

## 2023-07-04 DIAGNOSIS — T75.3XXD MOTION SICKNESS, SUBSEQUENT ENCOUNTER: ICD-10-CM

## 2023-07-06 RX ORDER — SCOLOPAMINE TRANSDERMAL SYSTEM 1 MG/1
1 PATCH, EXTENDED RELEASE TRANSDERMAL
Qty: 4 PATCH | Refills: 0 | Status: SHIPPED | OUTPATIENT
Start: 2023-07-06

## 2023-08-24 ENCOUNTER — OFFICE VISIT (OUTPATIENT)
Dept: FAMILY MEDICINE CLINIC | Facility: CLINIC | Age: 38
End: 2023-08-24
Payer: COMMERCIAL

## 2023-08-24 VITALS
SYSTOLIC BLOOD PRESSURE: 110 MMHG | DIASTOLIC BLOOD PRESSURE: 68 MMHG | RESPIRATION RATE: 16 BRPM | OXYGEN SATURATION: 98 % | WEIGHT: 143 LBS | BODY MASS INDEX: 26.31 KG/M2 | HEART RATE: 55 BPM | HEIGHT: 62 IN | TEMPERATURE: 97.7 F

## 2023-08-24 DIAGNOSIS — F90.0 ADHD, PREDOMINANTLY INATTENTIVE TYPE: Primary | ICD-10-CM

## 2023-08-24 PROCEDURE — 99213 OFFICE O/P EST LOW 20 MIN: CPT | Performed by: PHYSICIAN ASSISTANT

## 2023-08-24 RX ORDER — DEXTROAMPHETAMINE SACCHARATE, AMPHETAMINE ASPARTATE, DEXTROAMPHETAMINE SULFATE AND AMPHETAMINE SULFATE 2.5; 2.5; 2.5; 2.5 MG/1; MG/1; MG/1; MG/1
10 TABLET ORAL
Qty: 60 TABLET | Refills: 0 | Status: SHIPPED | OUTPATIENT
Start: 2023-08-24

## 2023-08-24 NOTE — PROGRESS NOTES
Assessment/Plan:    1. ADHD - restart Adderall 10 mg bid as needed. Reviewed old records, PDMP today. F/u as needed    Subjective:   Chief Complaint   Patient presents with   • ADHD     Would like to discuss going back on Adderall  Having difficulty concentrating  Has been off 6-7 years      Patient ID: Beni Moya is a 41 y.o. female. 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 Now in [address] [place] [address] and struggling to study. Requesting to restart Adderall for her graduate degree. The following portions of the patient's history were reviewed and updated as appropriate: allergies, current medications, past family history, past medical history, past social history, past surgical history and problem list.    Past Medical History:   Diagnosis Date   • Bunion     Have had since childhood   • COVID 2022   • Depression    • History of seasonal allergies    • Hypotension    • Normal delivery      daughter   • Varicella     childhood   • Wears glasses      Past Surgical History:   Procedure Laterality Date   • AUGMENTATION BREAST Bilateral    • INDUCED      • FL BLEPHAROPLASTY LOWER EYELID Bilateral 10/4/2022    Procedure: BLEPHAROPLASTY LOWER;  Surgeon: Rosalia Padilla MD;  Location: [facility] MAIN OR;  Service: Plastics   • FL  DELIVERY ONLY N/A 2018    Procedure:  SECTION ();   Surgeon: Bird Phillips MD;  Location: [facility];  Service: Obstetrics   • [facility] W/SESMD W/DIST Michael Proud Bilateral 2022    Procedure: Berenice Quijano;  Surgeon: Bladimir Amanda DPM;  Location: [facility] MAIN OR;  Service: Podiatry   • FL LIG/TRNSXJ FALOPIAN TUBE  DEL/ABDML SURG Bilateral 2018    Procedure: LIGATION/COAGULATION TUBAL;  Surgeon: Bird Phillips MD;  Location: [facility];  Service: Obstetrics   • WISDOM TOOTH EXTRACTION       Family History   Problem Relation Age of Onset   • No Known Problems Mother    • No Known Problems Father    • No Known Problems Brother    • No Known Problems Daughter    • No Known Problems Maternal Grandmother    • No Known Problems Maternal Grandfather    • No Known Problems Paternal Grandmother    • Cancer Paternal Grandfather         lung   • Cancer Maternal Aunt         melanoma   • Alcohol abuse Neg Hx    • Substance Abuse Neg Hx    • Mental illness Neg Hx      Social History     Socioeconomic History   • Marital status:      Spouse name: Vianey Florez   • Number of children: 1   • Years of education: Not on file   • Highest education level: Not on file   Occupational History   • Occupation: [employer] Employer: [employer]   Tobacco Use   • Smoking status: Former     Packs/day: 1.00     Years: 5.00     Total pack years: 5.00     Types: Cigarettes     Quit date:      Years since quittin.6   • Smokeless tobacco: Never   Vaping Use   • Vaping Use: Never used   Substance and Sexual Activity   • Alcohol use: Yes     Alcohol/week: 2.0 standard drinks of alcohol     Types: 2 Glasses of wine per week     Comment: Socially; 1-2X WEEK   • Drug use: Never   • Sexual activity: Yes     Partners: Male     Birth control/protection: Female Sterilization   Other Topics Concern   • Not on file   Social History Narrative   • Not on file     Social Determinants of Health     Financial Resource Strain: Not on file   Food Insecurity: Not on file   Transportation Needs: Not on file   Physical Activity: Not on file   Stress: Not on file   Social Connections: Not on file   Intimate Partner Violence: Not on file   Housing Stability: Not on file       Current Outpatient Medications:   •  hydroquinone 4 % cream, Apply twice daily all over face. Apply larger amount over brown spots.  Continue for 3 months, then stop., Disp: 28.35 g, Rfl: 2  •  pantoprazole (PROTONIX) 40 mg tablet, Take 1 tablet (40 mg total) by mouth daily, Disp: 30 tablet, Rfl: 0  •  scopolamine (TRANSDERM-SCOP) 1 mg/3 days TD 72 hr patch, Place 1 patch on the skin over 72 hours every third day, Disp: 4 patch, Rfl: 0  •  sucralfate (CARAFATE) 1 g tablet, Take 1 tablet (1 g total) by mouth 4 (four) times a day 30 min prior to meals and HS, Disp: 60 tablet, Rfl: 0    Review of Systems          Objective:    Vitals:    08/24/23 0854   BP: 110/68   Pulse: 55   Resp: 16   Temp: 97.7 °F (36.5 °C)   TempSrc: Temporal   SpO2: 98%   Weight: 64.9 kg (143 lb)   Height: 5' 2" (1.575 m)        Physical Exam  Constitutional:       Appearance: Normal appearance. HENT:      Head: Normocephalic and atraumatic. Neurological:      General: No focal deficit present. Mental Status: She is alert and oriented to person, place, and time. Psychiatric:         Mood and Affect: Mood normal.         Behavior: Behavior normal.         Thought Content:  Thought content normal.         Judgment: Judgment normal.

## 2023-09-12 ENCOUNTER — TELEPHONE (OUTPATIENT)
Age: 38
End: 2023-09-12

## 2023-09-12 NOTE — TELEPHONE ENCOUNTER
Patient called to schedule appt for a spot on her leg, previously seen by dr Gian Monteiro for this but since then it has grown and is peeling.   Explained wait list to pt and placed on wait list.  She will call back in oct/nov for upcoming schedule

## 2023-11-01 DIAGNOSIS — F90.0 ADHD, PREDOMINANTLY INATTENTIVE TYPE: ICD-10-CM

## 2023-11-01 RX ORDER — DEXTROAMPHETAMINE SACCHARATE, AMPHETAMINE ASPARTATE, DEXTROAMPHETAMINE SULFATE AND AMPHETAMINE SULFATE 2.5; 2.5; 2.5; 2.5 MG/1; MG/1; MG/1; MG/1
10 TABLET ORAL
Qty: 60 TABLET | Refills: 0 | Status: SHIPPED | OUTPATIENT
Start: 2023-11-01

## 2023-11-03 ENCOUNTER — OFFICE VISIT (OUTPATIENT)
Dept: FAMILY MEDICINE CLINIC | Facility: CLINIC | Age: 38
End: 2023-11-03
Payer: COMMERCIAL

## 2023-11-03 VITALS
HEIGHT: 63 IN | SYSTOLIC BLOOD PRESSURE: 110 MMHG | OXYGEN SATURATION: 100 % | HEART RATE: 56 BPM | TEMPERATURE: 97.7 F | WEIGHT: 144.8 LBS | RESPIRATION RATE: 16 BRPM | DIASTOLIC BLOOD PRESSURE: 68 MMHG | BODY MASS INDEX: 25.66 KG/M2

## 2023-11-03 DIAGNOSIS — Z11.1 SCREENING-PULMONARY TB: ICD-10-CM

## 2023-11-03 DIAGNOSIS — Z00.00 ANNUAL PHYSICAL EXAM: Primary | ICD-10-CM

## 2023-11-03 DIAGNOSIS — Z01.84 IMMUNITY STATUS TESTING: ICD-10-CM

## 2023-11-03 PROCEDURE — 99395 PREV VISIT EST AGE 18-39: CPT | Performed by: PHYSICIAN ASSISTANT

## 2023-11-03 NOTE — PATIENT INSTRUCTIONS
Wellness Visit for Adults   AMBULATORY CARE:   A wellness visit  is when you see your healthcare provider to get screened for health problems. Your healthcare provider will also give you advice on how to stay healthy. Write down your questions so you remember to ask them. Ask your healthcare provider how often you should have a wellness visit. What happens at a wellness visit:  Your healthcare provider will ask about your health, and your family history of health problems. This includes high blood pressure, heart disease, and cancer. He or she will ask if you have symptoms that concern you, if you smoke, and about your mood. You may also be asked about your intake of medicines, supplements, food, and alcohol. Any of the following may be done: Your weight  will be checked. Your height may also be checked so your body mass index (BMI) can be calculated. Your BMI shows if you are at a healthy weight. Your blood pressure  and heart rate will be checked. Your temperature may also be checked. Blood and urine tests  may be done. Blood tests may be done to check your cholesterol levels. Abnormal cholesterol levels increase your risk for heart disease and stroke. You may also need a blood or urine test to check for diabetes if you are at increased risk. Urine tests may be done to look for signs of an infection or kidney disease. A physical exam  includes checking your heartbeat and lungs with a stethoscope. Your healthcare provider may also check your skin to look for sun damage. Screening tests  may be recommended. A screening test is done to check for diseases that may not cause symptoms. The screening tests you may need depend on your age, gender, family history, and lifestyle habits. For example, colorectal screening may be recommended if you are 48years old or older. Screening tests you need if you are a woman:   A Pap smear  is used to screen for cervical cancer.  Pap smears are usually done every 3 to 5 years depending on your age. You may need them more often if you have had abnormal Pap smear test results in the past. Ask your healthcare provider how often you should have a Pap smear. A mammogram  is an x-ray of your breasts to screen for breast cancer. Experts recommend mammograms every 2 years starting at age 48 years. You may need a mammogram at age 52 years or younger if you have an increased risk for breast cancer. Talk to your healthcare provider about when you should start having mammograms and how often you need them. Vaccines you may need:   Get an influenza vaccine  every year. The influenza vaccine protects you from the flu. Several types of viruses cause the flu. The viruses change over time, so new vaccines are made each year. Get a tetanus-diphtheria (Td) booster vaccine  every 10 years. This vaccine protects you against tetanus and diphtheria. Tetanus is a severe infection that may cause painful muscle spasms and lockjaw. Diphtheria is a severe bacterial infection that causes a thick covering in the back of your mouth and throat. Get a human papillomavirus (HPV) vaccine  if you are female and aged 23 to 32 or male 23 to 24 and never received it. This vaccine protects you from HPV infection. HPV is the most common infection spread by sexual contact. HPV may also cause vaginal, penile, and anal cancers. Get a pneumococcal vaccine  if you are aged 72 years or older. The pneumococcal vaccine is an injection given to protect you from pneumococcal disease. Pneumococcal disease is an infection caused by pneumococcal bacteria. The infection may cause pneumonia, meningitis, or an ear infection. Get a shingles vaccine  if you are 60 or older, even if you have had shingles before. The shingles vaccine is an injection to protect you from the varicella-zoster virus. This is the same virus that causes chickenpox.  Shingles is a painful rash that develops in people who had chickenpox or have been exposed to the virus. How to eat healthy:  My Plate is a model for planning healthy meals. It shows the types and amounts of foods that should go on your plate. Fruits and vegetables make up about half of your plate, and grains and protein make up the other half. A serving of dairy is included on the side of your plate. The amount of calories and serving sizes you need depends on your age, gender, weight, and height. Examples of healthy foods are listed below:  Eat a variety of vegetables  such as dark green, red, and orange vegetables. You can also include canned vegetables low in sodium (salt) and frozen vegetables without added butter or sauces. Eat a variety of fresh fruits , canned fruit in 100% juice, frozen fruit, and dried fruit. Include whole grains. At least half of the grains you eat should be whole grains. Examples include whole-wheat bread, wheat pasta, brown rice, and whole-grain cereals such as oatmeal.    Eat a variety of protein foods such as seafood (fish and shellfish), lean meat, and poultry without skin (turkey and chicken). Examples of lean meats include pork leg, shoulder, or tenderloin, and beef round, sirloin, tenderloin, and extra lean ground beef. Other protein foods include eggs and egg substitutes, beans, peas, soy products, nuts, and seeds. Choose low-fat dairy products such as skim or 1% milk or low-fat yogurt, cheese, and cottage cheese. Limit unhealthy fats  such as butter, hard margarine, and shortening. Exercise:  Exercise at least 30 minutes per day on most days of the week. Some examples of exercise include walking, biking, dancing, and swimming. You can also fit in more physical activity by taking the stairs instead of the elevator or parking farther away from stores. Include muscle strengthening activities 2 days each week. Regular exercise provides many health benefits.  It helps you manage your weight, and decreases your risk for type 2 diabetes, heart disease, stroke, and high blood pressure. Exercise can also help improve your mood. Ask your healthcare provider about the best exercise plan for you. General health and safety guidelines:   Do not smoke. Nicotine and other chemicals in cigarettes and cigars can cause lung damage. Ask your healthcare provider for information if you currently smoke and need help to quit. E-cigarettes or smokeless tobacco still contain nicotine. Talk to your healthcare provider before you use these products. Limit alcohol. A drink of alcohol is 12 ounces of beer, 5 ounces of wine, or 1½ ounces of liquor. Lose weight, if needed. Being overweight increases your risk of certain health conditions. These include heart disease, high blood pressure, type 2 diabetes, and certain types of cancer. Protect your skin. Do not sunbathe or use tanning beds. Use sunscreen with a SPF 15 or higher. Apply sunscreen at least 15 minutes before you go outside. Reapply sunscreen every 2 hours. Wear protective clothing, hats, and sunglasses when you are outside. Drive safely. Always wear your seatbelt. Make sure everyone in your car wears a seatbelt. A seatbelt can save your life if you are in an accident. Do not use your cell phone when you are driving. This could distract you and cause an accident. Pull over if you need to make a call or send a text message. Practice safe sex. Use latex condoms if are sexually active and have more than one partner. Your healthcare provider may recommend screening tests for sexually transmitted infections (STIs). Wear helmets, lifejackets, and protective gear. Always wear a helmet when you ride a bike or motorcycle, go skiing, or play sports that could cause a head injury. Wear protective equipment when you play sports. Wear a lifejacket when you are on a boat or doing water sports.     © Copyright Leonor Ranks 2023 Information is for End User's use only and may not be sold, redistributed or otherwise used for commercial purposes. The above information is an  only. It is not intended as medical advice for individual conditions or treatments. Talk to your doctor, nurse or pharmacist before following any medical regimen to see if it is safe and effective for you.

## 2023-11-03 NOTE — PROGRESS NOTES
ADULT ANNUAL PHYSICAL  [address] \[facility]\"" PRACTICE    NAME: Jory Galdamez  AGE: 41 y.o. SEX: female  : 1985     DATE: 11/3/2023     Assessment and Plan:     Healthy 41year old female    Immunizations and preventive care screenings were discussed with patient today. Appropriate education was printed on patient's after visit summary. Counseling:  Alcohol/drug use: discussed moderation in alcohol intake, the recommendations for healthy alcohol use, and avoidance of illicit drug use. Dental Health: discussed importance of regular tooth brushing, flossing, and dental visits. Injury prevention: discussed safety/seat belts, safety helmets, smoke detectors, carbon dioxide detectors, and smoking near bedding or upholstery. Sexual health: discussed sexually transmitted diseases, partner selection, use of condoms, avoidance of unintended pregnancy, and contraceptive alternatives. Exercise: the importance of regular exercise/physical activity was discussed. Recommend exercise 3-5 times per week for at least 30 minutes. Varicella, Tb screen, hep b surface antibody         Return in 1 year (on 11/3/2024). Chief Complaint:     Chief Complaint   Patient presents with    Physical Exam      History of Present Illness:     Adult Annual Physical   Patient here for a comprehensive physical exam. The patient reports no problems. Diet and Physical Activity  Diet/Nutrition: well balanced diet. Exercise: moderate cardiovascular exercise.       Depression Screening  PHQ-2/9 Depression Screening    Little interest or pleasure in doing things: 0 - not at all  Feeling down, depressed, or hopeless: 0 - not at all  Trouble falling or staying asleep, or sleeping too much: 0 - not at all  Feeling tired or having little energy: 0 - not at all  Poor appetite or overeatin - not at all  Feeling bad about yourself - or that you are a failure or have let yourself or your family down: 0 - not at all  Trouble concentrating on things, such as reading the newspaper or watching television: 0 - not at all  Moving or speaking so slowly that other people could have noticed. Or the opposite - being so fidgety or restless that you have been moving around a lot more than usual: 0 - not at all  Thoughts that you would be better off dead, or of hurting yourself in some way: 0 - not at all  PHQ-9 Score: 0   PHQ-9 Interpretation: No or Minimal depression          General Health  Sleep: sleeps well. Hearing: normal - bilateral.  Vision: goes for regular eye exams, most recent eye exam >1 year ago, and wears glasses and contacts. Dental: regular dental visits and brushes teeth twice daily. /GYN Health  Pap due now       Review of Systems:     Review of Systems   Constitutional: Negative. HENT: Negative. Eyes: Negative. Respiratory: Negative. Cardiovascular: Negative. Gastrointestinal: Negative. Endocrine: Negative. Genitourinary: Negative. Musculoskeletal: Negative. Skin: Negative. Allergic/Immunologic: Negative. Neurological: Negative. Hematological: Negative. Psychiatric/Behavioral: Negative. Past Medical History:     Past Medical History:   Diagnosis Date    Bunion     Have had since childhood    COVID 2022    Depression     History of seasonal allergies     Hypotension     Normal delivery     2014 daughter    Varicella     childhood    Wears glasses       Past Surgical History:     Past Surgical History:   Procedure Laterality Date    AUGMENTATION BREAST Bilateral     BREAST SURGERY       SECTION      EYE SURGERY      INDUCED       MS BLEPHAROPLASTY LOWER EYELID Bilateral 10/04/2022    Procedure: BLEPHAROPLASTY LOWER;  Surgeon: Jessica Paul MD;  Location: [address] [place] [address] OR;  Service: Plastics    MS  DELIVERY ONLY N/A 2018    Procedure:  SECTION ();   Surgeon: Betsy Alcocer Piedad Munroe MD;  Location: [facility];  Service: [address] [place] [address] W/SESMDC W/DIST Meridee Primes OSTEOT Bilateral 2022    Procedure: Carlene Kayser;  Surgeon: Apoorva Rodriguez DPM;  Location:  MAIN OR;  Service: Podiatry    NH LIG/TRNSXJ FALOPIAN TUBE  DEL/ABDML SURG Bilateral 2018    Procedure: LIGATION/COAGULATION TUBAL;  Surgeon: Aries Lainez MD;  Location: [facility];  Service: Obstetrics    TUBAL LIGATION      WISDOM TOOTH EXTRACTION        Social History:     Social History     Socioeconomic History    Marital status:      Spouse name: Arnoldo Yi    Number of children: 1    Years of education: None    Highest education level: None   Occupational History    Occupation: [email or web address]      Employer: [employer]   Tobacco Use    Smoking status: Former     Packs/day: 1.00     Years: 5.00     Total pack years: 5.00     Types: Cigarettes     Quit date: 2010     Years since quittin.8    Smokeless tobacco: Never   Vaping Use    Vaping Use: Never used   Substance and Sexual Activity    Alcohol use: Yes     Comment: Socially    Drug use: No    Sexual activity: Yes     Partners: Male     Birth control/protection: Female Sterilization   Other Topics Concern    None   Social History Narrative    None     Social Determinants of Health     Financial Resource Strain: Not on file   Food Insecurity: Not on file   Transportation Needs: Not on file   Physical Activity: Not on file   Stress: Not on file   Social Connections: Not on file   Intimate Partner Violence: Not on file   Housing Stability: Not on file      Family History:     Family History   Problem Relation Age of Onset    No Known Problems Mother     ADD / ADHD Father     ADD / ADHD Brother     No Known Problems Daughter     No Known Problems Maternal Grandmother     No Known Problems Maternal Grandfather     No Known Problems Paternal Grandmother     Cancer Paternal Grandfather         lung    Cancer Maternal Aunt melanoma    Alcohol abuse Neg Hx     Substance Abuse Neg Hx     Mental illness Neg Hx       Current Medications:     Current Outpatient Medications   Medication Sig Dispense Refill    amphetamine-dextroamphetamine (ADDERALL, 10MG,) 10 mg tablet Take 1 tablet (10 mg total) by mouth 2 (two) times a day Max Daily Amount: 20 mg 60 tablet 0    scopolamine (TRANSDERM-SCOP) 1 mg/3 days TD 72 hr patch Place 1 patch on the skin over 72 hours every third day 4 patch 0    sucralfate (CARAFATE) 1 g tablet Take 1 tablet (1 g total) by mouth 4 (four) times a day 30 min prior to meals and HS 60 tablet 0    pantoprazole (PROTONIX) 40 mg tablet Take 1 tablet (40 mg total) by mouth daily (Patient not taking: Reported on 11/3/2023) 30 tablet 0     No current facility-administered medications for this visit. Allergies:     No Active Allergies   Physical Exam:     /68   Pulse 56   Temp 97.7 °F (36.5 °C)   Resp 16   Ht 5' 2.75" (1.594 m)   Wt 65.7 kg (144 lb 12.8 oz)   SpO2 100%   BMI 25.85 kg/m²     Physical Exam  Constitutional:       Appearance: Normal appearance. She is well-developed and normal weight. HENT:      Head: Normocephalic and atraumatic. Right Ear: Hearing normal.      Left Ear: Hearing normal.      Mouth/Throat:      Pharynx: Uvula midline. Eyes:      Extraocular Movements: Extraocular movements intact. Conjunctiva/sclera: Conjunctivae normal.      Pupils: Pupils are equal, round, and reactive to light. Neck:      Thyroid: No thyromegaly. Cardiovascular:      Rate and Rhythm: Normal rate and regular rhythm. Pulses: Normal pulses. Heart sounds: Normal heart sounds. No murmur heard. Pulmonary:      Effort: Pulmonary effort is normal.      Breath sounds: Normal breath sounds. Abdominal:      General: Abdomen is flat. Bowel sounds are normal. There is no distension. Palpations: Abdomen is soft. There is no mass. Tenderness: There is no abdominal tenderness. Musculoskeletal:         General: Normal range of motion. Cervical back: Normal range of motion and neck supple. Lymphadenopathy:      Cervical: No cervical adenopathy. Skin:     General: Skin is warm. Neurological:      General: No focal deficit present. Mental Status: She is alert and oriented to person, place, and time. Cranial Nerves: No cranial nerve deficit. Deep Tendon Reflexes: Reflexes normal.   Psychiatric:         Mood and Affect: Mood normal.         Behavior: Behavior normal.         Thought Content: Thought content normal.         Judgment: Judgment normal.          Liliana Atkinson PA-C   [address]    BMI Counseling: Body mass index is 25.85 kg/m². The BMI is above normal. Nutrition recommendations include reducing portion sizes.

## 2023-11-04 ENCOUNTER — APPOINTMENT (OUTPATIENT)
Dept: LAB | Facility: CLINIC | Age: 38
End: 2023-11-04
Payer: COMMERCIAL

## 2023-11-04 DIAGNOSIS — Z01.84 IMMUNITY STATUS TESTING: ICD-10-CM

## 2023-11-04 DIAGNOSIS — Z11.1 SCREENING-PULMONARY TB: ICD-10-CM

## 2023-11-04 PROCEDURE — 86480 TB TEST CELL IMMUN MEASURE: CPT

## 2023-11-04 PROCEDURE — 36415 COLL VENOUS BLD VENIPUNCTURE: CPT

## 2023-11-04 PROCEDURE — 86787 VARICELLA-ZOSTER ANTIBODY: CPT

## 2023-11-04 PROCEDURE — 86706 HEP B SURFACE ANTIBODY: CPT

## 2023-11-05 LAB — VZV IGG SER QL IA: NORMAL

## 2023-11-06 LAB
GAMMA INTERFERON BACKGROUND BLD IA-ACNC: 0.06 IU/ML
HBV SURFACE AB SER-ACNC: 458 MIU/ML
M TB IFN-G BLD-IMP: NEGATIVE
M TB IFN-G CD4+ BCKGRND COR BLD-ACNC: -0.01 IU/ML
M TB IFN-G CD4+ BCKGRND COR BLD-ACNC: -0.01 IU/ML
MITOGEN IGNF BCKGRD COR BLD-ACNC: 9.94 IU/ML

## 2023-11-15 ENCOUNTER — OFFICE VISIT (OUTPATIENT)
Dept: DERMATOLOGY | Facility: CLINIC | Age: 38
End: 2023-11-15
Payer: COMMERCIAL

## 2023-11-15 VITALS — TEMPERATURE: 97.6 F | BODY MASS INDEX: 25.27 KG/M2 | HEIGHT: 63 IN | WEIGHT: 142.6 LBS

## 2023-11-15 DIAGNOSIS — D48.5 NEOPLASM OF UNCERTAIN BEHAVIOR OF SKIN: Primary | ICD-10-CM

## 2023-11-15 PROCEDURE — 88305 TISSUE EXAM BY PATHOLOGIST: CPT | Performed by: STUDENT IN AN ORGANIZED HEALTH CARE EDUCATION/TRAINING PROGRAM

## 2023-11-15 PROCEDURE — 11102 TANGNTL BX SKIN SINGLE LES: CPT | Performed by: STUDENT IN AN ORGANIZED HEALTH CARE EDUCATION/TRAINING PROGRAM

## 2023-11-15 PROCEDURE — 88342 IMHCHEM/IMCYTCHM 1ST ANTB: CPT | Performed by: STUDENT IN AN ORGANIZED HEALTH CARE EDUCATION/TRAINING PROGRAM

## 2023-11-15 PROCEDURE — 99214 OFFICE O/P EST MOD 30 MIN: CPT | Performed by: STUDENT IN AN ORGANIZED HEALTH CARE EDUCATION/TRAINING PROGRAM

## 2023-11-15 NOTE — PROGRESS NOTES
[facility] Note     Patient Name: Ted Chase  Encounter Date: 11/15/2023     Have you been cared for by a [facility] in the last 3 years and, if so, which description applies to you? Yes. I have been here within the last 3 years, and my medical history has NOT changed since that time. I am FEMALE/of child-bearing potential.    REVIEW OF SYSTEMS:  Have you recently had or currently have any of the following? No changes in my recent health. PAST MEDICAL HISTORY:  Have you personally ever had or currently have any of the following? If "YES," then please provide more detail. No changes in my medical history. HISTORY OF IMMUNOSUPPRESSION: Do you have a history of any of the following:  Systemic Immunosuppression such as Diabetes, Biologic or Immunotherapy, Chemotherapy, Organ Transplantation, Bone Marrow Transplantation? No     Answering "YES" requires the addition of the dotphrase "IMMUNOSUPPRESSED" as the first diagnosis of the patient's visit. FAMILY HISTORY:  Any "first degree relatives" (parent, brother, sister, or child) with the following? No changes in my family's known health. PATIENT EXPERIENCE:    Do you want the Dermatologist to perform a COMPLETE skin exam today including a clinical examination under the "bra and underwear" areas? NO  If necessary, do we have your permission to call and leave a detailed message on your Preferred Phone number that includes your specific medical information? Yes      No Known Allergies   Current Outpatient Medications:     amphetamine-dextroamphetamine (ADDERALL, 10MG,) 10 mg tablet, Take 1 tablet (10 mg total) by mouth 2 (two) times a day Max Daily Amount: 20 mg, Disp: 60 tablet, Rfl: 0    scopolamine (TRANSDERM-SCOP) 1 mg/3 days TD 72 hr patch, Place 1 patch on the skin over 72 hours every third day, Disp: 4 patch, Rfl: 0          Whom besides the patient is providing clinical information about today's encounter?    NO ADDITIONAL HISTORIAN (patient alone provided history)    Physical Exam and Assessment/Plan by Diagnosis:    NEOPLASM OF UNCERTAIN BEHAVIOR OF SKIN    Physical Exam:  (Anatomic Location); (Size and Morphological Description); (Differential Diagnosis):  Right calf; 0.7 cm x 0.6 cm light brown asymmetric plaque; rule out seborrheic keratoses or other  Pertinent Positives:  Pertinent Negatives: Additional History of Present Condition:  Present for years. Changes in edges and intermittent peeling. Aunt  of melanoma. Assessment and Plan:  I have discussed with the patient that a sample of skin via a "skin biopsy” would be potentially helpful to further make a specific diagnosis under the microscope. Based on a thorough discussion of this condition and the management approach to it (including a comprehensive discussion of the known risks, side effects and potential benefits of treatment), the patient (family) agrees to implement the following specific plan:    Procedure:  Skin Biopsy. After a thorough discussion of treatment options and risk/benefits/alternatives (including but not limited to local pain, scarring, dyspigmentation, blistering, possible superinfection, and inability to confirm a diagnosis via histopathology), verbal and written consent were obtained and portion of the rash was biopsied for tissue sample. See below for consent that was obtained from patient and subsequent Procedure Note.     PROCEDURE TANGENTIAL (SHAVE) BIOPSY NOTE:    Performing Physician:   Anatomic Location; Clinical Description with size (cm); Pre-Op Diagnosis:   Right calf; 0.7 cm x 0.6 cm light brown asymmetric plaque; rule out seborrheic keratoses or other  Post-op diagnosis: Same     Local anesthesia: 1% xylocaine with epi      Topical anesthesia: None    Hemostasis: Aluminum chloride       After obtaining informed consent  at which time there was a discussion about the purpose of biopsy  and low risks of infection and bleeding. The area was prepped and draped in the usual fashion. Anesthesia was obtained with 1% lidocaine with epinephrine. A shave biopsy to an appropriate sampling depth was obtained by Shave (Dermablade or 15 blade) The resulting wound was covered with surgical ointment and bandaged appropriately. The patient tolerated the procedure well without complications and was without signs of functional compromise. Specimen has been sent for review by Dermatopathology. Standard post-procedure care has been explained and has been included in written form within the patient's copy of Informed Consent. INFORMED CONSENT DISCUSSION AND POST-OPERATIVE INSTRUCTIONS FOR PATIENT    I.  RATIONALE FOR PROCEDURE  I understand that a skin biopsy allows the Dermatologist to test a lesion or rash under the microscope to obtain a diagnosis. It usually involves numbing the area with numbing medication and removing a small piece of skin; sometimes the area will be closed with sutures. In this specific procedure, sutures are not usually needed. If any sutures are placed, then they are usually need to be removed in 2 weeks or less. I understand that my Dermatologist recommends that a skin "shave" biopsy be performed today. A local anesthetic, similar to the kind that a dentist uses when filling a cavity, will be injected with a very small needle into the skin area to be sampled. The injected skin and tissue underneath "will go to sleep” and become numb so no pain should be felt afterwards. An instrument shaped like a tiny "razor blade" (shave biopsy instrument) will be used to cut a small piece of tissue and skin from the area so that a sample of tissue can be taken and examined more closely under the microscope. A slight amount of bleeding will occur, but it will be stopped with direct pressure and a pressure bandage and any other appropriate methods.   I understands that a scar will form where the wound was created. Surgical ointment will be applied to help protect the wound. Sutures are not usually needed. II.  RISKS AND POTENTIAL COMPLICATIONS   I understand the risks and potential complications of a skin biopsy include but are not limited to the following:  Bleeding  Infection  Pain  Scar/keloid  Skin discoloration  Incomplete Removal  Recurrence  Nerve Damage/Numbness/Loss of Function  Allergic Reaction to Anesthesia  Biopsies are diagnostic procedures and based on findings additional treatment or evaluation may be required  Loss or destruction of specimen resulting in no additional findings    My Dermatologist has explained to me the nature of the condition, the nature of the procedure, and the benefits to be reasonably expected compared with alternative approaches. My Dermatologist has discussed the likelihood of major risks or complications of this procedure including the specific risks listed above, such as bleeding, infection, and scarring/keloid. I understand that a scar is expected after this procedure. I understand that my physician cannot predict if the scar will form a "keloid," which extends beyond the borders of the wound that is created. A keloid is a thick, painful, and bumpy scar. A keloid can be difficult to treat, as it does not always respond well to therapy, which includes injecting cortisone directly into the keloid every few weeks. While this usually reduces the pain and size of the scar, it does not eliminate it. I understand that photographs may be taken before and after the procedure. These will be maintained as part of the medical providers confidential records and may not be made available to me. I further authorize the medical provider to use the photographs for teaching purposes or to illustrate scientific papers, books, or lectures if in his/her judgment, medical research, education, or science may benefit from its use.     I have had an opportunity to fully inquire about the risks and benefits of this procedure and its alternatives. I have been given ample time and opportunity to ask questions and to seek a second opinion if I wished to do so. I acknowledge that there have specifically been no guarantees as to the cosmetic results from the procedure. I am aware that with any procedure there is always the possibility of an unexpected complication. III. POST-PROCEDURAL CARE (WHAT YOU WILL NEED TO DO "AFTER THE BIOPSY" TO OPTIMIZE HEALING)    Keep the area clean and dry. Try NOT to remove the bandage or get it wet for the first 24 hours. Gently clean the area and apply surgical ointment (such as Vaseline petrolatum ointment, which is available "over the counter" and not a prescription) to the biopsy site for up to 2 weeks straight. This acts to protect the wound from the outside world. Sutures are not usually placed in this procedure. If any sutures were placed, return for suture removal as instructed (generally 1 week for the face, 2 weeks for the body). Take Acetaminophen (Tylenol) for discomfort, if no contraindications. Ibuprofen or aspirin could make bleeding worse. Call our office immediately for signs of infection: fever, chills, increased redness, warmth, tenderness, discomfort/pain, or pus or foul smell coming from the wound. WHAT TO DO IF THERE IS ANY BLEEDING? If a small amount of bleeding is noticed, place a clean cloth over the area and apply firm pressure for ten minutes. Check the wound after 10 minutes of direct pressure. If bleeding persists, try one more time for an additional 10 minutes of direct pressure on the area. If the bleeding becomes heavier or does not stop after the second attempt, or if you have any other questions about this procedure, then please call your [place] by calling [phone] (SKIN).      I hereby acknowledge that I have reviewed and verified the site with my Dermatologist and have requested and authorized my Dermatologist to proceed with the procedure.       Scribe Attestation      I,:  Mikaela Hill am acting as a scribe while in the presence of the attending physician.:       I,:  Rahel Felix MD personally performed the services described in this documentation    as scribed in my presence.:

## 2023-11-21 PROCEDURE — 88342 IMHCHEM/IMCYTCHM 1ST ANTB: CPT | Performed by: STUDENT IN AN ORGANIZED HEALTH CARE EDUCATION/TRAINING PROGRAM

## 2023-11-21 PROCEDURE — 88305 TISSUE EXAM BY PATHOLOGIST: CPT | Performed by: STUDENT IN AN ORGANIZED HEALTH CARE EDUCATION/TRAINING PROGRAM

## 2023-11-22 NOTE — RESULT ENCOUNTER NOTE
DERMATOPATHOLOGY RESULT NOTE    Results reviewed by ordering physician. Called patient to personally discuss results. SOAMAI message      Instructions for Clinical Derm Team:   (remember to route Result Note to appropriate staff):    None    Result & Plan by Specimen:    Specimen A: benign  Plan: reassured, benign    Case Report  Surgical Pathology Report                         Case: [id number]                                  Authorizing Provider:  Ana Moffett MD Collected:           11/15/2023 1238              Ordering Location:     [facility]      Received:            11/15/2023 [facility]                                                                Pathologist:           Louisa Martinez MD                                                          Specimen:    Skin, Other, A: right calf                                                              Final Diagnosis  A. Skin, right calf, shave biopsy:    Consistent with LENTIGO (see note). Note: SOX10 immunostain was reviewed. Multiple levels examined.

## 2023-12-15 DIAGNOSIS — T75.3XXD MOTION SICKNESS, SUBSEQUENT ENCOUNTER: ICD-10-CM

## 2023-12-15 RX ORDER — SCOLOPAMINE TRANSDERMAL SYSTEM 1 MG/1
1 PATCH, EXTENDED RELEASE TRANSDERMAL
Qty: 4 PATCH | Refills: 0 | Status: SHIPPED | OUTPATIENT
Start: 2023-12-15

## 2023-12-30 DIAGNOSIS — F90.0 ADHD, PREDOMINANTLY INATTENTIVE TYPE: ICD-10-CM

## 2024-01-02 RX ORDER — DEXTROAMPHETAMINE SACCHARATE, AMPHETAMINE ASPARTATE, DEXTROAMPHETAMINE SULFATE AND AMPHETAMINE SULFATE 2.5; 2.5; 2.5; 2.5 MG/1; MG/1; MG/1; MG/1
10 TABLET ORAL
Qty: 60 TABLET | Refills: 0 | Status: SHIPPED | OUTPATIENT
Start: 2024-01-02

## 2024-03-01 ENCOUNTER — OFFICE VISIT (OUTPATIENT)
Dept: OBGYN CLINIC | Facility: CLINIC | Age: 39
End: 2024-03-01
Payer: COMMERCIAL

## 2024-03-01 VITALS — SYSTOLIC BLOOD PRESSURE: 110 MMHG | BODY MASS INDEX: 26.32 KG/M2 | DIASTOLIC BLOOD PRESSURE: 80 MMHG | WEIGHT: 147.4 LBS

## 2024-03-01 DIAGNOSIS — N94.3 PMS (PREMENSTRUAL SYNDROME): Primary | ICD-10-CM

## 2024-03-01 PROCEDURE — 99213 OFFICE O/P EST LOW 20 MIN: CPT | Performed by: PHYSICIAN ASSISTANT

## 2024-03-01 RX ORDER — NORETHINDRONE ACETATE AND ETHINYL ESTRADIOL, ETHINYL ESTRADIOL AND FERROUS FUMARATE 1MG-10(24)
1 KIT ORAL DAILY
Qty: 84 TABLET | Refills: 0 | Status: SHIPPED | OUTPATIENT
Start: 2024-03-01

## 2024-03-01 NOTE — PROGRESS NOTES
Assessment/Plan:    1. PMS (premenstrual syndrome)  - reviewed pt is a reasonable candidate for Lo Loestrin. Pt desires trial of this medication for symptoms. On menses presently so advised her to start pill today. Reviewed possibility of BTB first few months after initiation. Reviewed option of skipping placebo pills and using pill for continuous use in the summer to avoid menses if she does get a menses on this pill. Given this is a low dose pill she may be amenorrheic on this option. Follow up at the time of annual exam  - Norethin-Eth Estrad-Fe Biphas (Lo Loestrin Fe) 1 MG-10 MCG / 10 MCG TABS; Take 1 tablet by mouth in the morning  Dispense: 84 tablet; Refill: 0    Subjective:      Patient ID: Lorelei Galdamez is a 38 y.o. female.    Patient presents to the office today for a problem visit.  She is hoping to discuss a birth control option today to help with her premenstrual symptoms.  She has had tubal ligation so does not need a method for contraceptive purposes.  She states over the past year or so she has been struggling with bloating, overeating, irritability, moodiness, fatigue leading up to her menses. She typically experiences these symptoms the week or so before her menses. She has a friend who started Lo Loestrin and has been doing well on this pill for similar concerns and patient inquires if this might be a reasonable option for to try. She is also interested in starting a pill to hopefully skip and avoid menses for wedding in the summer.    Menses are regular and not overly heavy or crampy.    She is not a smoker. Denies hs of HTN, migraine with aura, liver/galbladder dz, personal or Fhx of clot/clotting disorder.      The following portions of the patient's history were reviewed and updated as appropriate: allergies, current medications, past family history, past medical history, past social history, past surgical history, and problem list.    Review of Systems      Objective:      /80 (BP  Location: Left arm, Patient Position: Sitting, Cuff Size: Standard)   Wt 66.9 kg (147 lb 6.4 oz)   LMP 02/27/2024 (Exact Date)   BMI 26.32 kg/m²          Physical Exam  Vitals reviewed.   Constitutional:       Appearance: Normal appearance.   HENT:      Head: Normocephalic and atraumatic.   Pulmonary:      Effort: Pulmonary effort is normal.   Abdominal:      General: Abdomen is flat. There is no distension.      Palpations: There is no mass.   Musculoskeletal:      Right lower leg: No edema.      Left lower leg: No edema.   Neurological:      Mental Status: She is alert.   Psychiatric:         Mood and Affect: Mood normal.         Behavior: Behavior normal.         Thought Content: Thought content normal.         Judgment: Judgment normal.

## 2024-03-24 DIAGNOSIS — F90.0 ADHD, PREDOMINANTLY INATTENTIVE TYPE: ICD-10-CM

## 2024-03-24 DIAGNOSIS — T75.3XXD MOTION SICKNESS, SUBSEQUENT ENCOUNTER: ICD-10-CM

## 2024-03-25 RX ORDER — SCOLOPAMINE TRANSDERMAL SYSTEM 1 MG/1
1 PATCH, EXTENDED RELEASE TRANSDERMAL
Qty: 4 PATCH | Refills: 0 | Status: SHIPPED | OUTPATIENT
Start: 2024-03-25

## 2024-03-25 RX ORDER — DEXTROAMPHETAMINE SACCHARATE, AMPHETAMINE ASPARTATE, DEXTROAMPHETAMINE SULFATE AND AMPHETAMINE SULFATE 2.5; 2.5; 2.5; 2.5 MG/1; MG/1; MG/1; MG/1
10 TABLET ORAL
Qty: 60 TABLET | Refills: 0 | Status: SHIPPED | OUTPATIENT
Start: 2024-03-25

## 2024-04-08 ENCOUNTER — ANNUAL EXAM (OUTPATIENT)
Dept: OBGYN CLINIC | Facility: CLINIC | Age: 39
End: 2024-04-08
Payer: COMMERCIAL

## 2024-04-08 VITALS — SYSTOLIC BLOOD PRESSURE: 92 MMHG | BODY MASS INDEX: 26.43 KG/M2 | WEIGHT: 148 LBS | DIASTOLIC BLOOD PRESSURE: 64 MMHG

## 2024-04-08 DIAGNOSIS — Z01.419 ENCOUNTER FOR GYNECOLOGICAL EXAMINATION (GENERAL) (ROUTINE) WITHOUT ABNORMAL FINDINGS: Primary | ICD-10-CM

## 2024-04-08 PROCEDURE — S0612 ANNUAL GYNECOLOGICAL EXAMINA: HCPCS | Performed by: STUDENT IN AN ORGANIZED HEALTH CARE EDUCATION/TRAINING PROGRAM

## 2024-04-08 PROCEDURE — G0145 SCR C/V CYTO,THINLAYER,RESCR: HCPCS | Performed by: STUDENT IN AN ORGANIZED HEALTH CARE EDUCATION/TRAINING PROGRAM

## 2024-04-08 PROCEDURE — G0476 HPV COMBO ASSAY CA SCREEN: HCPCS | Performed by: STUDENT IN AN ORGANIZED HEALTH CARE EDUCATION/TRAINING PROGRAM

## 2024-04-08 NOTE — PROGRESS NOTES
Lorelei Galdamez  1985    Assessment and Plan:  Yearly exam without abnormality.     -Pap collected today. We reviewed ASCCP guidelines for Pap testing today.   -Common side effects of OCPs reviewed, encouraged Lorelei to call if spotting continues for transition to loestrin    RTO one year for yearly exam or sooner as needed.        CC:  Yearly exam    S:  38 y.o. female here for yearly exam.       LMP 3/21   Contraception: Tubal, OCP   Last Pap:2017 NILM/HPV -    Non-smoker, social drinker  Exercises irregularly    Doing ok overall. Has had light spotting since initiation of lo loestrin. Had some bloating on initiation, much improved now.     Sexual activity: She is sexually active without pain, bleeding or dryness.     STD testing:  She does not want STD testing today.     Family hx of breast cancer: denies  Family hx of ovarian cancer: denies   Family hx of colon cancer: denies     Denies hot flushes, dyspareunia, abnormal uterine bleeding, urinary/fecal incontinence, changes in energy levels, mood.       Current Outpatient Medications:     amphetamine-dextroamphetamine (ADDERALL, 10MG,) 10 mg tablet, Take 1 tablet (10 mg total) by mouth 2 (two) times a day Max Daily Amount: 20 mg, Disp: 60 tablet, Rfl: 0    Norethin-Eth Estrad-Fe Biphas (Lo Loestrin Fe) 1 MG-10 MCG / 10 MCG TABS, Take 1 tablet by mouth in the morning, Disp: 84 tablet, Rfl: 0    scopolamine (TRANSDERM-SCOP) 1 mg/3 days TD 72 hr patch, Place 1 patch on the skin over 72 hours every third day, Disp: 4 patch, Rfl: 0  Social History     Socioeconomic History    Marital status:      Spouse name: Noé    Number of children: 1    Years of education: Not on file    Highest education level: Not on file   Occupational History    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     Employer: [employer]   Tobacco Use    Smoking status: Former     Current packs/day: 0.00     Average packs/day: 1 pack/day for 5.0 years (5.0 ttl pk-yrs)     Types:  Cigarettes     Start date: 2005     Quit date: 2010     Years since quittin.2    Smokeless tobacco: Never   Vaping Use    Vaping status: Never Used   Substance and Sexual Activity    Alcohol use: Yes     Comment: Socially    Drug use: No    Sexual activity: Yes     Partners: Male     Birth control/protection: Female Sterilization   Other Topics Concern    Not on file   Social History Narrative    Not on file     Social Determinants of Health     Financial Resource Strain: Not on file   Food Insecurity: Not on file   Transportation Needs: Not on file   Physical Activity: Not on file   Stress: Not on file   Social Connections: Not on file   Intimate Partner Violence: Not on file   Housing Stability: Not on file     Family History   Problem Relation Age of Onset    No Known Problems Mother     ADD / ADHD Father     ADD / ADHD Brother     No Known Problems Daughter     No Known Problems Maternal Grandmother     No Known Problems Maternal Grandfather     No Known Problems Paternal Grandmother     Cancer Paternal Grandfather         lung    Cancer Maternal Aunt         melanoma    Alcohol abuse Neg Hx     Substance Abuse Neg Hx     Mental illness Neg Hx     Breast cancer Neg Hx     Colon cancer Neg Hx     Ovarian cancer Neg Hx       Past Medical History:   Diagnosis Date    Bunion     Have had since childhood    COVID 2022    Depression     History of seasonal allergies     Hypotension     Normal delivery     2014 daughter    Varicella     childhood    Wears glasses         Review of Systems   Respiratory: Negative.    Cardiovascular: Negative.    Gastrointestinal: Negative for constipation and diarrhea.   Genitourinary: Negative for difficulty urinating, pelvic pain, vaginal bleeding, vaginal discharge, itching or odor.    O:  Blood pressure 92/64, weight 67.1 kg (148 lb), last menstrual period 2024, not currently breastfeeding.    Patient appears well and is not in distress  Neck is supple without  masses  Breasts are symmetrical without mass, tenderness, nipple discharge, skin changes or adenopathy.   Abdomen is soft and nontender without masses.   External genitals are normal without lesions or rashes.  Urethral meatus and urethra are normal  Bladder is normal to palpation  Vagina is normal without discharge or bleeding.   Cervix is normal without discharge or lesion.   Uterus is normal, mobile, nontender without palpable mass.  Adnexa are normal, nontender, without palpable mass.

## 2024-04-09 LAB
HPV HR 12 DNA CVX QL NAA+PROBE: NEGATIVE
HPV16 DNA CVX QL NAA+PROBE: NEGATIVE
HPV18 DNA CVX QL NAA+PROBE: NEGATIVE

## 2024-04-13 LAB
LAB AP GYN PRIMARY INTERPRETATION: NORMAL
Lab: NORMAL

## 2024-04-23 DIAGNOSIS — N94.3 PMS (PREMENSTRUAL SYNDROME): ICD-10-CM

## 2024-04-24 RX ORDER — NORETHINDRONE ACETATE AND ETHINYL ESTRADIOL, ETHINYL ESTRADIOL AND FERROUS FUMARATE 1MG-10(24)
1 KIT ORAL DAILY
Qty: 84 TABLET | Refills: 1 | Status: SHIPPED | OUTPATIENT
Start: 2024-04-24

## 2024-05-28 DIAGNOSIS — F90.0 ADHD, PREDOMINANTLY INATTENTIVE TYPE: ICD-10-CM

## 2024-05-28 RX ORDER — DEXTROAMPHETAMINE SACCHARATE, AMPHETAMINE ASPARTATE, DEXTROAMPHETAMINE SULFATE AND AMPHETAMINE SULFATE 2.5; 2.5; 2.5; 2.5 MG/1; MG/1; MG/1; MG/1
10 TABLET ORAL
Qty: 60 TABLET | Refills: 0 | Status: SHIPPED | OUTPATIENT
Start: 2024-05-28

## 2024-07-29 DIAGNOSIS — F90.0 ADHD, PREDOMINANTLY INATTENTIVE TYPE: ICD-10-CM

## 2024-07-30 RX ORDER — DEXTROAMPHETAMINE SACCHARATE, AMPHETAMINE ASPARTATE, DEXTROAMPHETAMINE SULFATE AND AMPHETAMINE SULFATE 2.5; 2.5; 2.5; 2.5 MG/1; MG/1; MG/1; MG/1
10 TABLET ORAL
Qty: 60 TABLET | Refills: 0 | Status: SHIPPED | OUTPATIENT
Start: 2024-07-30

## 2024-09-07 ENCOUNTER — APPOINTMENT (OUTPATIENT)
Dept: LAB | Facility: CLINIC | Age: 39
End: 2024-09-07

## 2024-09-07 DIAGNOSIS — Z00.8 OTHER SPECIFIED GENERAL MEDICAL EXAMINATION: ICD-10-CM

## 2024-09-07 LAB
CHOLEST SERPL-MCNC: 196 MG/DL
EST. AVERAGE GLUCOSE BLD GHB EST-MCNC: 100 MG/DL
HBA1C MFR BLD: 5.1 %
HDLC SERPL-MCNC: 74 MG/DL
LDLC SERPL CALC-MCNC: 107 MG/DL (ref 0–100)
NONHDLC SERPL-MCNC: 122 MG/DL
TRIGL SERPL-MCNC: 75 MG/DL

## 2024-09-07 PROCEDURE — 36415 COLL VENOUS BLD VENIPUNCTURE: CPT

## 2024-09-07 PROCEDURE — 80061 LIPID PANEL: CPT

## 2024-09-07 PROCEDURE — 83036 HEMOGLOBIN GLYCOSYLATED A1C: CPT

## 2024-09-17 DIAGNOSIS — F90.0 ADHD, PREDOMINANTLY INATTENTIVE TYPE: ICD-10-CM

## 2024-09-17 RX ORDER — DEXTROAMPHETAMINE SACCHARATE, AMPHETAMINE ASPARTATE, DEXTROAMPHETAMINE SULFATE AND AMPHETAMINE SULFATE 2.5; 2.5; 2.5; 2.5 MG/1; MG/1; MG/1; MG/1
10 TABLET ORAL
Qty: 60 TABLET | Refills: 0 | Status: SHIPPED | OUTPATIENT
Start: 2024-09-17

## 2024-11-15 DIAGNOSIS — F90.0 ADHD, PREDOMINANTLY INATTENTIVE TYPE: ICD-10-CM

## 2024-11-15 DIAGNOSIS — T75.3XXD MOTION SICKNESS, SUBSEQUENT ENCOUNTER: ICD-10-CM

## 2024-11-15 RX ORDER — SCOLOPAMINE TRANSDERMAL SYSTEM 1 MG/1
1 PATCH, EXTENDED RELEASE TRANSDERMAL
Qty: 4 PATCH | Refills: 0 | Status: SHIPPED | OUTPATIENT
Start: 2024-11-15

## 2024-11-15 RX ORDER — DEXTROAMPHETAMINE SACCHARATE, AMPHETAMINE ASPARTATE, DEXTROAMPHETAMINE SULFATE AND AMPHETAMINE SULFATE 2.5; 2.5; 2.5; 2.5 MG/1; MG/1; MG/1; MG/1
10 TABLET ORAL
Qty: 60 TABLET | Refills: 0 | Status: SHIPPED | OUTPATIENT
Start: 2024-11-15

## 2024-12-03 DIAGNOSIS — Z11.1 SCREENING-PULMONARY TB: Primary | ICD-10-CM

## 2024-12-04 ENCOUNTER — APPOINTMENT (OUTPATIENT)
Dept: LAB | Facility: CLINIC | Age: 39
End: 2024-12-04
Payer: COMMERCIAL

## 2024-12-04 DIAGNOSIS — Z11.1 SCREENING-PULMONARY TB: ICD-10-CM

## 2024-12-04 PROCEDURE — 36415 COLL VENOUS BLD VENIPUNCTURE: CPT

## 2024-12-04 PROCEDURE — 86480 TB TEST CELL IMMUN MEASURE: CPT

## 2024-12-05 LAB
GAMMA INTERFERON BACKGROUND BLD IA-ACNC: 0.13 IU/ML
M TB IFN-G BLD-IMP: NEGATIVE
M TB IFN-G CD4+ BCKGRND COR BLD-ACNC: 0.02 IU/ML
M TB IFN-G CD4+ BCKGRND COR BLD-ACNC: 0.03 IU/ML
MITOGEN IGNF BCKGRD COR BLD-ACNC: 9.87 IU/ML

## 2024-12-06 ENCOUNTER — RESULTS FOLLOW-UP (OUTPATIENT)
Dept: FAMILY MEDICINE CLINIC | Facility: CLINIC | Age: 39
End: 2024-12-06

## 2024-12-09 NOTE — TELEPHONE ENCOUNTER
----- Message from Fabby Barrow PA-C sent at 12/9/2024 10:54 AM EST -----  I dont know why but I can't send a [employer] message. Can you let the patient know her TB test was negative. thanks

## 2024-12-09 NOTE — TELEPHONE ENCOUNTER
Pt called back for lab result. I relayed Fabby's message that her TB test was negative.    breanna

## 2025-01-24 DIAGNOSIS — F90.0 ADHD, PREDOMINANTLY INATTENTIVE TYPE: ICD-10-CM

## 2025-01-24 RX ORDER — DEXTROAMPHETAMINE SACCHARATE, AMPHETAMINE ASPARTATE, DEXTROAMPHETAMINE SULFATE AND AMPHETAMINE SULFATE 2.5; 2.5; 2.5; 2.5 MG/1; MG/1; MG/1; MG/1
10 TABLET ORAL
Qty: 60 TABLET | Refills: 0 | Status: SHIPPED | OUTPATIENT
Start: 2025-01-24

## 2025-05-01 DIAGNOSIS — T75.3XXD MOTION SICKNESS, SUBSEQUENT ENCOUNTER: ICD-10-CM

## 2025-05-02 RX ORDER — SCOPOLAMINE 1 MG/3D
1 PATCH, EXTENDED RELEASE TRANSDERMAL
Qty: 4 PATCH | Refills: 0 | Status: SHIPPED | OUTPATIENT
Start: 2025-05-02

## 2025-05-18 DIAGNOSIS — F90.0 ADHD, PREDOMINANTLY INATTENTIVE TYPE: ICD-10-CM

## 2025-05-19 RX ORDER — DEXTROAMPHETAMINE SACCHARATE, AMPHETAMINE ASPARTATE, DEXTROAMPHETAMINE SULFATE AND AMPHETAMINE SULFATE 2.5; 2.5; 2.5; 2.5 MG/1; MG/1; MG/1; MG/1
10 TABLET ORAL
Qty: 60 TABLET | Refills: 0 | Status: SHIPPED | OUTPATIENT
Start: 2025-05-19

## 2025-06-30 ENCOUNTER — OFFICE VISIT (OUTPATIENT)
Dept: FAMILY MEDICINE CLINIC | Facility: CLINIC | Age: 40
End: 2025-06-30
Payer: COMMERCIAL

## 2025-06-30 VITALS
BODY MASS INDEX: 26.29 KG/M2 | TEMPERATURE: 98.7 F | OXYGEN SATURATION: 99 % | WEIGHT: 148.4 LBS | HEIGHT: 63 IN | DIASTOLIC BLOOD PRESSURE: 82 MMHG | RESPIRATION RATE: 14 BRPM | SYSTOLIC BLOOD PRESSURE: 120 MMHG | HEART RATE: 67 BPM

## 2025-06-30 DIAGNOSIS — Z00.00 ANNUAL PHYSICAL EXAM: Primary | ICD-10-CM

## 2025-06-30 PROBLEM — F43.21 SITUATIONAL DEPRESSION: Status: RESOLVED | Noted: 2020-11-17 | Resolved: 2025-06-30

## 2025-06-30 PROBLEM — H02.839 DERMATOCHALASIS: Status: RESOLVED | Noted: 2022-09-28 | Resolved: 2025-06-30

## 2025-06-30 PROCEDURE — 99395 PREV VISIT EST AGE 18-39: CPT | Performed by: PHYSICIAN ASSISTANT

## 2025-06-30 NOTE — PROGRESS NOTES
Adult Annual Physical  Name: Lorelei Galdamez      : 1985      MRN: [id number]  Encounter Provider: Fabby Barrow PA-C  Encounter Date: 2025   Encounter department: [facility]    :  Assessment & Plan  Annual physical exam    Orders:    Lipid Panel with Direct LDL reflex; Future    Comprehensive metabolic panel; Future    CBC and differential; Future    TSH, 3rd generation with Free T4 reflex; Future        Preventive Screenings:  - Diabetes Screening: orders placed  - Cholesterol Screening: orders placed   - Hepatitis C screening: screening up-to-date   - HIV screening: screening up-to-date   - Cervical cancer screening: screening up-to-date   - Colon cancer screening: screening not indicated   - Lung cancer screening: screening not indicated     Counseling/Anticipatory Guidance:    - Dental health: discussed importance of regular tooth brushing, flossing, and dental visits.   - Diet: discussed recommendations for a healthy/well-balanced diet.   - Exercise: the importance of regular exercise/physical activity was discussed. Recommend exercise 3-5 times per week for at least 30 minutes.   - Injury prevention: discussed safety/seat belts, safety helmets, smoke detectors, carbon monoxide detectors, and smoking near bedding or upholstery.          History of Present Illness     Adult Annual Physical:  Patient presents for annual physical.     Diet and Physical Activity:  - Diet/Nutrition: well balanced diet, adequate fiber intake and adequate whole grain intake.  - Exercise: vigorous cardiovascular exercise, strength training exercises, 5-7 times a week on average and 30-60 minutes on average.    Depression Screening:    - PHQ-9 Score: 2    General Health:  - Sleep: sleeps well and 7-8 hours of sleep on average.  - Hearing: normal hearing right ear and normal hearing left ear.  - Vision: wears glasses.  - Dental: regular dental visits.    /GYN Health:  -  "Follows with GYN: yes.   - Menopause: premenopausal.   - Last menstrual cycle: 6/18/2025.   - History of STDs: no  - Contraception: tubal ligation.      Advanced Care Planning:  - Has an advanced directive?: no    - Has a durable medical POA?: no      Review of Systems   Constitutional: Negative.    HENT: Negative.     Eyes: Negative.    Respiratory: Negative.     Cardiovascular: Negative.    Gastrointestinal: Negative.    Endocrine: Negative.    Genitourinary: Negative.    Musculoskeletal: Negative.    Skin: Negative.    Allergic/Immunologic: Negative.    Neurological: Negative.    Hematological: Negative.    Psychiatric/Behavioral: Negative.           Objective   /82   Pulse 67   Temp 98.7 °F (37.1 °C)   Resp 14   Ht 5' 2.75\" (1.594 m)   Wt 67.3 kg (148 lb 6.4 oz)   LMP 06/18/2025   SpO2 99%   BMI 26.50 kg/m²     Physical Exam  Constitutional:       Appearance: Normal appearance. She is well-developed and normal weight.   HENT:      Head: Normocephalic and atraumatic.      Right Ear: Hearing and external ear normal.      Left Ear: Hearing and external ear normal.      Mouth/Throat:      Pharynx: Uvula midline.     Eyes:      Extraocular Movements: Extraocular movements intact.      Conjunctiva/sclera: Conjunctivae normal.      Pupils: Pupils are equal, round, and reactive to light.     Neck:      Thyroid: No thyromegaly.     Cardiovascular:      Rate and Rhythm: Normal rate and regular rhythm.      Pulses: Normal pulses.      Heart sounds: Normal heart sounds. No murmur heard.  Pulmonary:      Effort: Pulmonary effort is normal.      Breath sounds: Normal breath sounds.   Abdominal:      General: Abdomen is flat. Bowel sounds are normal. There is no distension.      Palpations: Abdomen is soft. There is no mass.      Tenderness: There is no abdominal tenderness.     Musculoskeletal:         General: Normal range of motion.      Cervical back: Normal range of motion and neck supple.   Lymphadenopathy: "      Cervical: No cervical adenopathy.     Skin:     General: Skin is warm.     Neurological:      General: No focal deficit present.      Mental Status: She is alert and oriented to person, place, and time.      Deep Tendon Reflexes: Reflexes normal.     Psychiatric:         Mood and Affect: Mood normal.         Behavior: Behavior normal.         Thought Content: Thought content normal.         Judgment: Judgment normal.

## 2025-07-27 DIAGNOSIS — T75.3XXD MOTION SICKNESS, SUBSEQUENT ENCOUNTER: ICD-10-CM

## 2025-07-27 DIAGNOSIS — F90.0 ADHD, PREDOMINANTLY INATTENTIVE TYPE: ICD-10-CM

## 2025-07-29 RX ORDER — DEXTROAMPHETAMINE SACCHARATE, AMPHETAMINE ASPARTATE, DEXTROAMPHETAMINE SULFATE AND AMPHETAMINE SULFATE 2.5; 2.5; 2.5; 2.5 MG/1; MG/1; MG/1; MG/1
10 TABLET ORAL
Qty: 60 TABLET | Refills: 0 | Status: SHIPPED | OUTPATIENT
Start: 2025-07-29

## 2025-07-29 RX ORDER — SCOPOLAMINE 1 MG/3D
1 PATCH, EXTENDED RELEASE TRANSDERMAL
Qty: 4 PATCH | Refills: 0 | Status: SHIPPED | OUTPATIENT
Start: 2025-07-29

## 2025-08-11 ENCOUNTER — APPOINTMENT (OUTPATIENT)
Dept: LAB | Facility: CLINIC | Age: 40
End: 2025-08-11
Payer: COMMERCIAL